# Patient Record
Sex: MALE | Race: WHITE | NOT HISPANIC OR LATINO | Employment: OTHER | URBAN - METROPOLITAN AREA
[De-identification: names, ages, dates, MRNs, and addresses within clinical notes are randomized per-mention and may not be internally consistent; named-entity substitution may affect disease eponyms.]

---

## 2019-09-10 ENCOUNTER — PRE VISIT (OUTPATIENT)
Dept: UROLOGY | Facility: CLINIC | Age: 69
End: 2019-09-10

## 2019-09-10 NOTE — TELEPHONE ENCOUNTER
ONCOLOGY INTAKE: Records Information      APPT INFORMATION:  Referring provider:  self referred  Referring provider s clinic:  na  Reason for visit/diagnosis:  enlarged prostate and prostate cancer  Has patient been notified of appointment date and time?: yes, per pt    RECORDS INFORMATION:  Were the records received with the referral (via [employer])? no    Has patient been seen for any external appt for this diagnosis? yes    If yes, where? [facility]    Has patient had any imaging or procedures outside of [facility] for this condition? yes      If Yes, where? [facility]    ADDITIONAL INFORMATION:  Some notes care everywhere

## 2019-10-16 ENCOUNTER — OFFICE VISIT (OUTPATIENT)
Dept: UROLOGY | Facility: CLINIC | Age: 69
End: 2019-10-16
Payer: COMMERCIAL

## 2019-10-16 VITALS
WEIGHT: 165 LBS | BODY MASS INDEX: 23.62 KG/M2 | HEART RATE: 60 BPM | DIASTOLIC BLOOD PRESSURE: 60 MMHG | HEIGHT: 70 IN | SYSTOLIC BLOOD PRESSURE: 124 MMHG

## 2019-10-16 DIAGNOSIS — N40.0 ENLARGED PROSTATE: Primary | ICD-10-CM

## 2019-10-16 LAB
ALBUMIN UR-MCNC: NEGATIVE MG/DL
APPEARANCE UR: CLEAR
BILIRUB UR QL STRIP: NEGATIVE
COLOR UR AUTO: YELLOW
GLUCOSE UR STRIP-MCNC: NEGATIVE MG/DL
HGB UR QL STRIP: NEGATIVE
KETONES UR STRIP-MCNC: NEGATIVE MG/DL
LEUKOCYTE ESTERASE UR QL STRIP: ABNORMAL
NITRATE UR QL: NEGATIVE
PH UR STRIP: 7 PH (ref 5–7)
PR INTERVAL - MUSE: 9
SOURCE: ABNORMAL
SP GR UR STRIP: 1.01 (ref 1–1.03)
UROBILINOGEN UR STRIP-ACNC: 0.2 EU/DL (ref 0.2–1)

## 2019-10-16 PROCEDURE — 99203 OFFICE O/P NEW LOW 30 MIN: CPT | Mod: 25 | Performed by: UROLOGY

## 2019-10-16 PROCEDURE — 81003 URINALYSIS AUTO W/O SCOPE: CPT | Performed by: UROLOGY

## 2019-10-16 PROCEDURE — 51798 US URINE CAPACITY MEASURE: CPT | Performed by: UROLOGY

## 2019-10-16 RX ORDER — MULTIVIT-MIN/IRON/FOLIC ACID/K 18-600-40
2000 CAPSULE ORAL DAILY
COMMUNITY

## 2019-10-16 RX ORDER — NITROGLYCERIN 0.4 MG/1
0.4 TABLET SUBLINGUAL EVERY 5 MIN PRN
COMMUNITY

## 2019-10-16 RX ORDER — RIBOFLAVIN (VITAMIN B2) 100 MG
100 TABLET ORAL 2 TIMES DAILY
COMMUNITY
Start: 2017-08-24

## 2019-10-16 RX ORDER — ASPIRIN 81 MG/1
81 TABLET ORAL DAILY
COMMUNITY
Start: 2015-08-07

## 2019-10-16 RX ORDER — DIPHENOXYLATE HYDROCHLORIDE AND ATROPINE SULFATE 2.5; .025 MG/1; MG/1
1 TABLET ORAL DAILY
COMMUNITY
Start: 2017-08-24

## 2019-10-16 RX ORDER — DIPHENOXYLATE HYDROCHLORIDE AND ATROPINE SULFATE 2.5; .025 MG/1; MG/1
1 TABLET ORAL
COMMUNITY
Start: 2017-08-24 | End: 2020-06-04

## 2019-10-16 RX ORDER — METOPROLOL TARTRATE 25 MG/1
12.5 TABLET, FILM COATED ORAL 2 TIMES DAILY
COMMUNITY
Start: 2017-08-24

## 2019-10-16 ASSESSMENT — PAIN SCALES - GENERAL: PAINLEVEL: NO PAIN (0)

## 2019-10-16 ASSESSMENT — MIFFLIN-ST. JEOR: SCORE: 1509.69

## 2019-10-16 NOTE — LETTER
10/16/2019       RE: Xu Rush  [address] [address]     Dear Colleague,    Thank you for referring your patient, Xu Rush, to the [facility] [place] at [facility]. Please see a copy of my visit note below.    Xu Rush  is a pleasant 69-year-old male who was diagnosed with grade 3+3 and grade 4+3 adenocarcinoma the prostate a year ago.  He has had an elevated PSA for the past 9 years.  In 2012 he had biopsies at [facility] that were apparently benign.  He had repeat biopsies in 2013 that were benign.  In November last year biopsies with Dr. Cruz showed cancer in the right mid gland and right base.  Apparently his digital rectal exam was benign.  He most recently saw Dr. Bates  in August and had a PSA of 13.1.  A transrectal ultrasound showed a 184 cc gland.  Dr. Villalpando wanted the patient to take finasteride to decrease the size of his of his prostate before he attempted surgical extirpation.  Unfortunately, finasteride worsen the ringing in the patient's ears and he stopped the medication.  He has not had any therapy for his cancer or consider Lupron therapy.  A CT scan of the abdomen and pelvis and a bone scan last November showed no metastases but a 1.1 x 1.2 cm enhancing mass involving the lateral left mid kidney, suspicious for renal cell carcinoma  He is having no trouble voiding.  He has had no hematuria or dysuria  Other past medical history: MI in 2011-his cardiologist says his heart is fit for surgery.  Previous hernia repair, cystoscopy, non-smoker  Medications: Baby aspirin, vitamin D, coenzyme Q 10, metoprolol, multivitamin, vitamin C   Allergies: None  Review of systems: As above  Postvoid residual 9 ml  Exam: Alert and oriented, normal appearance, normal vital signs.  Normal respirations, neuro grossly intact.  Normal sphincter tone, no rectal mass or impaction, large  prostate with very large right lobe of the prostate that is not fixed  Assessment: Grade 4 adenocarcinoma of the prostate, large prostate, left renal mass-probably a renal cell carcinoma  Discussed briefly with Dr. Nolen.  Plan: Will obtain repeat CT scan of the abdomen pelvis  Without and with IV contrast, a bone scan and he will see Dr. Nolen for consultation regarding radical prostatectomy in follow-up of left renal mass        Again, thank you for allowing me to participate in the care of your patient.      Sincerely,    Alfonso Ulloa MD

## 2019-10-16 NOTE — PROGRESS NOTES
Xu Rush  is a pleasant 69-year-old male who was diagnosed with grade 3+3 and grade 4+3 adenocarcinoma the prostate a year ago.  He has had an elevated PSA for the past 9 years.  In 2012 he had biopsies at [facility] that were apparently benign.  He had repeat biopsies in 2013 that were benign.  In November last year biopsies with Dr. Cruz showed cancer in the right mid gland and right base.  Apparently his digital rectal exam was benign.  He most recently saw Dr. Bates  in August and had a PSA of 13.1.  A transrectal ultrasound showed a 184 cc gland.  Dr. Villalpando wanted the patient to take finasteride to decrease the size of his of his prostate before he attempted surgical extirpation.  Unfortunately, finasteride worsen the ringing in the patient's ears and he stopped the medication.  He has not had any therapy for his cancer or consider Lupron therapy.  A CT scan of the abdomen and pelvis and a bone scan last November showed no metastases but a 1.1 x 1.2 cm enhancing mass involving the lateral left mid kidney, suspicious for renal cell carcinoma  He is having no trouble voiding.  He has had no hematuria or dysuria  Other past medical history: MI in 2011-his cardiologist says his heart is fit for surgery.  Previous hernia repair, cystoscopy, non-smoker  Medications: Baby aspirin, vitamin D, coenzyme Q 10, metoprolol, multivitamin, vitamin C   Allergies: None  Review of systems: As above  Postvoid residual 9 ml  Exam: Alert and oriented, normal appearance, normal vital signs.  Normal respirations, neuro grossly intact.  Normal sphincter tone, no rectal mass or impaction, large prostate with very large right lobe of the prostate that is not fixed  Assessment: Grade 4 adenocarcinoma of the prostate, large prostate, left renal mass-probably a renal cell carcinoma  Discussed briefly with Dr. Nolen.  Plan: Will obtain repeat CT scan of the abdomen pelvis  Without and with IV contrast, a bone  scan and he will see Dr. Nolen for consultation regarding radical prostatectomy in follow-up of left renal mass

## 2019-10-21 ENCOUNTER — TELEPHONE (OUTPATIENT)
Dept: UROLOGY | Facility: CLINIC | Age: 69
End: 2019-10-21

## 2019-10-21 DIAGNOSIS — N40.0 ENLARGED PROSTATE: ICD-10-CM

## 2019-10-21 DIAGNOSIS — C61 PROSTATE CANCER (H): Primary | ICD-10-CM

## 2019-10-21 DIAGNOSIS — N28.89 LEFT RENAL MASS: ICD-10-CM

## 2019-10-21 NOTE — TELEPHONE ENCOUNTER
Orders placed. Called Xu. He will call to schedule imaging.  MICHAEL Lange RN  ____________________________________________________    Yes, I'd like him to have these scans before our appointment.     Thanks,   Je Nolen M.D.   _____________________________________________________    Per Dr. Ulloa's note of 10/16/19, imaging needed is CT Abdomen and Pelvis with and without contrast, and a bone scan.   Message and question below forwarded to Dr. Nolen.  MICHAEL Lange RN      [facility]    Phone Message    May a detailed message be left on voicemail: yes    Reason for Call: Other: Pt called and said that Dr. Ulloa would like for the pt to do a CT scan before he sees Dr. Nolen on 11/04.  Pt said that at the [facility] their soonest is 11/01.  Pt wanted to know if that would be enough time or should he come to the clinic for the CT scan. Pt said that Fridays in the afternoon would work best.  Please call the pt back to discuss this. Thanks!     Action Taken: Message routed to:  [facility] ([facility] urology clinic

## 2019-10-30 ENCOUNTER — HOSPITAL ENCOUNTER (OUTPATIENT)
Dept: CT IMAGING | Facility: CLINIC | Age: 69
Discharge: HOME | End: 2019-10-30
Attending: UROLOGY | Admitting: UROLOGY
Payer: COMMERCIAL

## 2019-10-30 ENCOUNTER — HOSPITAL ENCOUNTER (OUTPATIENT)
Dept: NUCLEAR MEDICINE | Facility: CLINIC | Age: 69
Setting detail: NUCLEAR MEDICINE
End: 2019-10-30
Attending: UROLOGY
Payer: COMMERCIAL

## 2019-10-30 ENCOUNTER — HOSPITAL ENCOUNTER (OUTPATIENT)
Dept: NUCLEAR MEDICINE | Facility: CLINIC | Age: 69
Setting detail: NUCLEAR MEDICINE
Discharge: HOME | End: 2019-10-30
Attending: UROLOGY | Admitting: UROLOGY
Payer: COMMERCIAL

## 2019-10-30 DIAGNOSIS — N28.89 LEFT RENAL MASS: ICD-10-CM

## 2019-10-30 DIAGNOSIS — C61 PROSTATE CANCER (H): ICD-10-CM

## 2019-10-30 DIAGNOSIS — N40.0 ENLARGED PROSTATE: ICD-10-CM

## 2019-10-30 LAB
CREAT BLD-MCNC: 1 MG/DL (ref 0.66–1.25)
GFR SERPL CREATININE-BSD FRML MDRD: 74 ML/MIN/{1.73_M2}

## 2019-10-30 PROCEDURE — 82565 ASSAY OF CREATININE: CPT

## 2019-10-30 PROCEDURE — 78306 BONE IMAGING WHOLE BODY: CPT

## 2019-10-30 PROCEDURE — 34300033 ZZH RX 343: Performed by: UROLOGY

## 2019-10-30 PROCEDURE — 25000125 ZZHC RX 250: Performed by: UROLOGY

## 2019-10-30 PROCEDURE — 74177 CT ABD & PELVIS W/CONTRAST: CPT

## 2019-10-30 PROCEDURE — A9503 TC99M MEDRONATE: HCPCS | Performed by: UROLOGY

## 2019-10-30 PROCEDURE — 25000128 H RX IP 250 OP 636: Performed by: UROLOGY

## 2019-10-30 RX ORDER — IOPAMIDOL 755 MG/ML
81 INJECTION, SOLUTION INTRAVASCULAR ONCE
Status: COMPLETED | OUTPATIENT
Start: 2019-10-30 | End: 2019-10-30

## 2019-10-30 RX ORDER — TC 99M MEDRONATE 20 MG/10ML
25 INJECTION, POWDER, LYOPHILIZED, FOR SOLUTION INTRAVENOUS ONCE
Status: COMPLETED | OUTPATIENT
Start: 2019-10-30 | End: 2019-10-30

## 2019-10-30 RX ADMIN — IOPAMIDOL 81 ML: 755 INJECTION, SOLUTION INTRAVENOUS at 11:57

## 2019-10-30 RX ADMIN — SODIUM CHLORIDE 63 ML: 9 INJECTION, SOLUTION INTRAVENOUS at 11:57

## 2019-10-30 RX ADMIN — TC 99M MEDRONATE 28 MCI.: 20 INJECTION, POWDER, LYOPHILIZED, FOR SOLUTION INTRAVENOUS at 11:48

## 2019-11-04 ENCOUNTER — OFFICE VISIT (OUTPATIENT)
Dept: UROLOGY | Facility: CLINIC | Age: 69
End: 2019-11-04
Payer: COMMERCIAL

## 2019-11-04 VITALS
SYSTOLIC BLOOD PRESSURE: 142 MMHG | DIASTOLIC BLOOD PRESSURE: 68 MMHG | HEIGHT: 70 IN | WEIGHT: 165 LBS | BODY MASS INDEX: 23.62 KG/M2 | HEART RATE: 60 BPM

## 2019-11-04 DIAGNOSIS — C61 PROSTATE CANCER (H): Primary | ICD-10-CM

## 2019-11-04 DIAGNOSIS — N28.89 LEFT RENAL MASS: ICD-10-CM

## 2019-11-04 PROCEDURE — 99214 OFFICE O/P EST MOD 30 MIN: CPT | Performed by: UROLOGY

## 2019-11-04 ASSESSMENT — PAIN SCALES - GENERAL: PAINLEVEL: NO PAIN (0)

## 2019-11-04 ASSESSMENT — MIFFLIN-ST. JEOR: SCORE: 1509.69

## 2019-11-04 NOTE — LETTER
"11/4/2019     RE: Xu Rush  [address] [facility] [address]     Dear Colleague,    Thank you for referring your patient, Xu Rush, to the [facility]OLOG[facility] [place] at [facility]. Please see a copy of my visit note below.    CHANDAN  CHIEF COMPLAINT   It was my pleasure to see Xu Rush who is a 69 year old male for follow-up of prostate cancer .      HPI   Xu Rush is a very pleasant 69 year old male who presents with a history of prostate cancer. He was seen by Dr. Ulloa on 10/16/19 and referred for surgical consultation. He has history of [place] 4+3=7 prostate cancer - pathology report not available. Has TRUS/Bx with Dr. Cruz in 11/2018 which demonstrated prostate cancer in the right mid gland and right base with benign DEREK and evidence of 184cc gland. PSA 13.1 at that time. He saw Dr. Villalpando who recommended trial of finasteride 5mg (Proscar) to shrink the prostate. Unfortunately, finasteride caused tinnitus and the patient stopped the medication. CT and bone scan from 11/2018 without evidence of mets, but a 1.2cm left renal mass.     No LUTS and low PVR     PHYSICAL EXAM  Patient is a 69 year old  male   Vitals: Blood pressure (!) 142/68, pulse 60, height 1.778 m (5' 10\"), weight 74.8 kg (165 lb).  General Appearance Adult: Body mass index is 23.68 kg/m .  Alert, no acute distress, oriented  HENT: throat/mouth:normal, good dentition  Lungs: no respiratory distress, or pursed lip breathing  Heart: No obvious jugular venous distension present  Abdomen: soft, nontender, no organomegaly or masses  Musculoskeltal: extremities normal, no peripheral edema  Skin: no suspicious lesions or rashes  Neuro: Alert, oriented, speech and mentation normal  Psych: affect and mood normal  Gait: Normal    UA RESULTS:  Recent Labs   Lab Test 10/16/19  1308   COLOR Yellow   APPEARANCE Clear   URINEGLC Negative   URINEBILI Negative "   URINEKETONE Negative   SG 1.010   UBLD Negative   URINEPH 7.0   PROTEIN Negative   UROBILINOGEN 0.2   NITRITE Negative   LEUKEST Trace*       IMAGING:  All pertinent imaging reviewed:    All imaging studies reviewed by me.  I personally reviewed these imaging films.  A formal report from radiology will follow.    NM BONE SCAN:  FINDINGS: There is a moderate-sized focus of moderately intense  increased radiotracer uptake within the right medial posterior pelvis  adjacent to the sacroiliac joint. This would correspond to the area of  sclerosis seen in the right iliac bone on the CT earlier today. There  are a few scattered small areas of increased radiotracer uptake within  bilateral ribs. This includes two adjacent left posterior mid to lower  ribs, possibly the eighth and ninth ribs. Their adjacent location  would suggest this is posttraumatic. There is an additional area of  increased uptake in the posterior aspect of a right upper rib, likely  the fourth rib. This is indeterminate. There is degenerative uptake  within both wrists and to a mild degree the right knee. No other  abnormal osseous uptake is seen. Normal soft tissue distribution is  noted.                                                                  IMPRESSION:   1. Increased radiotracer uptake within the right iliac bone adjacent  to the sacroiliac joint corresponding to the area of sclerosis on the  CT. In a patient with prostate cancer, metastatic disease needs to be  considered. If definitive diagnosis is needed, a biopsy could be  considered.  2. Small focus of increased uptake in the right posterior fourth rib.  This is an indeterminate lesion, but metastatic disease cannot be  excluded.  3. Probable posttraumatic changes of two adjacent left lower ribs and  degenerative changes elsewhere as described above.   4. No abnormal uptake is seen within the left iliac bone corresponding  to the area of lucency on the CT. The CT finding is,  therefore, of  doubtful clinical significance.    CT ABD/PEL:  FINDINGS:   Lung bases: Lung bases are clear. No pleural effusion or pericardial  effusion.     Abdomen: Scattered hypodensities are noted throughout the liver, one  at the hepatic dome represents a simple cyst, the rest are too small  to characterize. Scattered subcentimeter hypodensities are noted  throughout both kidneys, too small to characterize. The spleen,  adrenal glands, and pancreas show no focal abnormality. Postoperative  changes of cholecystectomy. No intrahepatic or extrahepatic biliary  dilatation. Hepatic and portal veins are patent. No intraperitoneal  free air or free fluid.     Small and large bowel are normal in caliber without evidence of  obstruction. The prostate is heterogeneous and enlarged measuring 8.3  x 6.5 cm. The prostate is causing mass effect on the posterior aspect  of the bladder. Prominent lymph node is seen posterior and to the  right of the prostate measuring 1.6 x 1.2 cm (series 5, image 67).  Abdominal aortic aneurysm. Nonenlarged bilateral periaortic lymph  nodes are noted.     Bones: Ill-defined sclerotic lesion in the right iliac bone measuring  2.9 cm (series 5, image 31). There are two lytic lesions in the left  iliac bone measuring 1.3 cm and 1.1 cm (series 5, image 31). These  will be better evaluated on bone scan from same day.                                                                   IMPRESSION:  1. Heterogeneous enlarged prostate.  2. Prominent lymph node along the posterior right aspect of the  prostate measuring 1.6 x 1.2 cm, metastatic disease cannot be  excluded.  3. Indeterminant sclerotic lesion in the right iliac bone and lytic  lesions in the left iliac bone, these will be better evaluated on the  nuclear medicine bone scan from today.    ASSESSMENT and PLAN  69 year old man with PSA 13.1, [place] 4+3=7 prostate cancer with enlarge gland at 184cc and CT with 1.6cm lymph node vs locally  advanced disease on the right base.    - We discussed that his case is unique and challenging given the size of his gland  - We discussed that I will discuss his case at our tumor board and plan for follow up in 2-3 weeks for further discussion    LEFT renal mass  - on updated imaging from 10/30/19 the small renal hypodensities were too small characterize  - We discussed that this is reassuring and that we will obtain follow up surveillance imaging in the future,     I spent over 25 minutes with the patient.  Over half this time was spent on counseling regarding the above.    Je Nolen MD   Urology  [facility]  Clinic Phone [phone]

## 2019-11-04 NOTE — PROGRESS NOTES
"[facility]  CHIEF COMPLAINT   It was my pleasure to see Xu Rush who is a 69 year old male for follow-up of prostate cancer .      HPI   Xu Rush is a very pleasant 69 year old male who presents with a history of prostate cancer. He was seen by Dr. Ulloa on 10/16/19 and referred for surgical consultation. He has history of [place] 4+3=7 prostate cancer - pathology report not available. Has TRUS/Bx with Dr. Cruz in 11/2018 which demonstrated prostate cancer in the right mid gland and right base with benign DEREK and evidence of 184cc gland. PSA 13.1 at that time. He saw Dr. Villalpando who recommended trial of finasteride 5mg (Proscar) to shrink the prostate. Unfortunately, finasteride caused tinnitus and the patient stopped the medication. CT and bone scan from 11/2018 without evidence of mets, but a 1.2cm left renal mass.     No LUTS and low PVR     PHYSICAL EXAM  Patient is a 69 year old  male   Vitals: Blood pressure (!) 142/68, pulse 60, height 1.778 m (5' 10\"), weight 74.8 kg (165 lb).  General Appearance Adult: Body mass index is 23.68 kg/m .  Alert, no acute distress, oriented  HENT: throat/mouth:normal, good dentition  Lungs: no respiratory distress, or pursed lip breathing  Heart: No obvious jugular venous distension present  Abdomen: soft, nontender, no organomegaly or masses  Musculoskeltal: extremities normal, no peripheral edema  Skin: no suspicious lesions or rashes  Neuro: Alert, oriented, speech and mentation normal  Psych: affect and mood normal  Gait: Normal    UA RESULTS:  Recent Labs   Lab Test 10/16/19  1308   COLOR Yellow   APPEARANCE Clear   URINEGLC Negative   URINEBILI Negative   URINEKETONE Negative   SG 1.010   UBLD Negative   URINEPH 7.0   PROTEIN Negative   UROBILINOGEN 0.2   NITRITE Negative   LEUKEST Trace*       IMAGING:  All pertinent imaging reviewed:    All imaging studies reviewed by me.  I personally reviewed these imaging films.  A formal report from radiology will " follow.    NM BONE SCAN:  FINDINGS: There is a moderate-sized focus of moderately intense  increased radiotracer uptake within the right medial posterior pelvis  adjacent to the sacroiliac joint. This would correspond to the area of  sclerosis seen in the right iliac bone on the CT earlier today. There  are a few scattered small areas of increased radiotracer uptake within  bilateral ribs. This includes two adjacent left posterior mid to lower  ribs, possibly the eighth and ninth ribs. Their adjacent location  would suggest this is posttraumatic. There is an additional area of  increased uptake in the posterior aspect of a right upper rib, likely  the fourth rib. This is indeterminate. There is degenerative uptake  within both wrists and to a mild degree the right knee. No other  abnormal osseous uptake is seen. Normal soft tissue distribution is  noted.                                                                  IMPRESSION:   1. Increased radiotracer uptake within the right iliac bone adjacent  to the sacroiliac joint corresponding to the area of sclerosis on the  CT. In a patient with prostate cancer, metastatic disease needs to be  considered. If definitive diagnosis is needed, a biopsy could be  considered.  2. Small focus of increased uptake in the right posterior fourth rib.  This is an indeterminate lesion, but metastatic disease cannot be  excluded.  3. Probable posttraumatic changes of two adjacent left lower ribs and  degenerative changes elsewhere as described above.   4. No abnormal uptake is seen within the left iliac bone corresponding  to the area of lucency on the CT. The CT finding is, therefore, of  doubtful clinical significance.    CT ABD/PEL:  FINDINGS:   Lung bases: Lung bases are clear. No pleural effusion or pericardial  effusion.     Abdomen: Scattered hypodensities are noted throughout the liver, one  at the hepatic dome represents a simple cyst, the rest are too small  to  characterize. Scattered subcentimeter hypodensities are noted  throughout both kidneys, too small to characterize. The spleen,  adrenal glands, and pancreas show no focal abnormality. Postoperative  changes of cholecystectomy. No intrahepatic or extrahepatic biliary  dilatation. Hepatic and portal veins are patent. No intraperitoneal  free air or free fluid.     Small and large bowel are normal in caliber without evidence of  obstruction. The prostate is heterogeneous and enlarged measuring 8.3  x 6.5 cm. The prostate is causing mass effect on the posterior aspect  of the bladder. Prominent lymph node is seen posterior and to the  right of the prostate measuring 1.6 x 1.2 cm (series 5, image 67).  Abdominal aortic aneurysm. Nonenlarged bilateral periaortic lymph  nodes are noted.     Bones: Ill-defined sclerotic lesion in the right iliac bone measuring  2.9 cm (series 5, image 31). There are two lytic lesions in the left  iliac bone measuring 1.3 cm and 1.1 cm (series 5, image 31). These  will be better evaluated on bone scan from same day.                                                                   IMPRESSION:  1. Heterogeneous enlarged prostate.  2. Prominent lymph node along the posterior right aspect of the  prostate measuring 1.6 x 1.2 cm, metastatic disease cannot be  excluded.  3. Indeterminant sclerotic lesion in the right iliac bone and lytic  lesions in the left iliac bone, these will be better evaluated on the  nuclear medicine bone scan from today.    ASSESSMENT and PLAN  69 year old man with PSA 13.1, [place] 4+3=7 prostate cancer with enlarge gland at 184cc and CT with 1.6cm lymph node vs locally advanced disease on the right base.    - We discussed that his case is unique and challenging given the size of his gland  - We discussed that I will discuss his case at our tumor board and plan for follow up in 2-3 weeks for further discussion    LEFT renal mass  - on updated imaging from 10/30/19 the  small renal hypodensities were too small characterize  - We discussed that this is reassuring and that we will obtain follow up surveillance imaging in the future,     I spent over 25 minutes with the patient.  Over half this time was spent on counseling regarding the above.    Je Nolen MD   Urology  [facility]  Clinic Phone [phone]

## 2019-11-04 NOTE — NURSING NOTE
Chief Complaint   Patient presents with     Clinic Care Coordination - Follow-up     Prostate Cancer      Milly Flores LPN

## 2019-11-20 ENCOUNTER — OFFICE VISIT (OUTPATIENT)
Dept: UROLOGY | Facility: CLINIC | Age: 69
End: 2019-11-20
Payer: COMMERCIAL

## 2019-11-20 VITALS
HEART RATE: 69 BPM | HEIGHT: 70 IN | BODY MASS INDEX: 23.62 KG/M2 | OXYGEN SATURATION: 97 % | WEIGHT: 165 LBS | DIASTOLIC BLOOD PRESSURE: 80 MMHG | SYSTOLIC BLOOD PRESSURE: 140 MMHG

## 2019-11-20 DIAGNOSIS — C61 MALIGNANT NEOPLASM OF PROSTATE (H): Primary | ICD-10-CM

## 2019-11-20 PROCEDURE — 96402 CHEMO HORMON ANTINEOPL SQ/IM: CPT | Performed by: UROLOGY

## 2019-11-20 PROCEDURE — 99214 OFFICE O/P EST MOD 30 MIN: CPT | Mod: 25 | Performed by: UROLOGY

## 2019-11-20 ASSESSMENT — MIFFLIN-ST. JEOR: SCORE: 1509.69

## 2019-11-20 ASSESSMENT — PAIN SCALES - GENERAL: PAINLEVEL: NO PAIN (0)

## 2019-11-20 NOTE — NURSING NOTE
Chief Complaint   Patient presents with     Follow Up     patient here to discuss prostate results     The following medication was given:     MEDICATION:  Lupron [place] 22.5 mg  ROUTE: IM  SITE: [facility]  DOSE: 22.5  LOT #: [id number]  : SERGIO  EXPIRATION DATE: 02/20/2022   NDC#: 2513-7392-56    Was there drug waste? No  Multi-dose vial: No    Gina Tamayo CMA  November 20, 2019

## 2019-11-20 NOTE — PROGRESS NOTES
"[facility]  CHIEF COMPLAINT   It was my pleasure to see Xu Rush who is a 69 year old male for follow-up of prostate cancer .      HPI   Xu Rush is a very pleasant 69 year old male who presents with a history of prostate cancer. He was seen by Dr. Ulloa on 10/16/19 and referred for surgical consultation. He has history of [place] 4+3=7 prostate cancer - pathology report not available. Has TRUS/Bx with Dr. Cruz in 11/2018 which demonstrated prostate cancer in the right mid gland and right base with benign DEREK and evidence of 184cc gland. PSA 13.1 at that time. He saw Dr. Villalpando who recommended trial of finasteride 5mg (Proscar) to shrink the prostate. Unfortunately, finasteride caused tinnitus and the patient stopped the medication. CT and bone scan from 11/2018 without evidence of mets, but a 1.2cm left renal mass.     No LUTS and low PVR     PHYSICAL EXAM  Patient is a 69 year old  male   Vitals: Blood pressure (!) 140/80, pulse 69, height 1.778 m (5' 10\"), weight 74.8 kg (165 lb), SpO2 97 %.  General Appearance Adult: Body mass index is 23.68 kg/m .  Alert, no acute distress, oriented  HENT: throat/mouth:normal, good dentition  Lungs: no respiratory distress, or pursed lip breathing  Heart: No obvious jugular venous distension present  Abdomen: soft, nontender, no organomegaly or masses  Musculoskeltal: extremities normal, no peripheral edema  Skin: no suspicious lesions or rashes  Neuro: Alert, oriented, speech and mentation normal  Psych: affect and mood normal  Gait: Normal    UA RESULTS:  Recent Labs   Lab Test 10/16/19  1308   COLOR Yellow   APPEARANCE Clear   URINEGLC Negative   URINEBILI Negative   URINEKETONE Negative   SG 1.010   UBLD Negative   URINEPH 7.0   PROTEIN Negative   UROBILINOGEN 0.2   NITRITE Negative   LEUKEST Trace*     PATHOLOGY:   TRUS/BX 11/12/18 BY DR. CRUZ  A. LEFT BASE - NO EVIDENCE OF DISEASE   B. LEFT MID - NO EVIDENCE OF DISEASE   C. LEFT APEX - NO EVIDENCE OF " DISEASE   D. RIGHT BASE - [place] 4+3=7 in 2/2 cores, involving 5-10%  E. RIGHT MID - [place] 3+3=6 in 1/2 cores, involving 5%  F. RIGHT APEX - NO EVIDENCE OF DISEASE     IMAGING:  All pertinent imaging reviewed:    All imaging studies reviewed by me.  I personally reviewed these imaging films.  A formal report from radiology will follow.    NM BONE SCAN:  FINDINGS: There is a moderate-sized focus of moderately intense  increased radiotracer uptake within the right medial posterior pelvis  adjacent to the sacroiliac joint. This would correspond to the area of  sclerosis seen in the right iliac bone on the CT earlier today. There  are a few scattered small areas of increased radiotracer uptake within  bilateral ribs. This includes two adjacent left posterior mid to lower  ribs, possibly the eighth and ninth ribs. Their adjacent location  would suggest this is posttraumatic. There is an additional area of  increased uptake in the posterior aspect of a right upper rib, likely  the fourth rib. This is indeterminate. There is degenerative uptake  within both wrists and to a mild degree the right knee. No other  abnormal osseous uptake is seen. Normal soft tissue distribution is  noted.                                                                  IMPRESSION:   1. Increased radiotracer uptake within the right iliac bone adjacent  to the sacroiliac joint corresponding to the area of sclerosis on the  CT. In a patient with prostate cancer, metastatic disease needs to be  considered. If definitive diagnosis is needed, a biopsy could be  considered.  2. Small focus of increased uptake in the right posterior fourth rib.  This is an indeterminate lesion, but metastatic disease cannot be  excluded.  3. Probable posttraumatic changes of two adjacent left lower ribs and  degenerative changes elsewhere as described above.   4. No abnormal uptake is seen within the left iliac bone corresponding  to the area of lucency on the CT.  The CT finding is, therefore, of  doubtful clinical significance.    CT ABD/PEL:  FINDINGS:   Lung bases: Lung bases are clear. No pleural effusion or pericardial  effusion.     Abdomen: Scattered hypodensities are noted throughout the liver, one  at the hepatic dome represents a simple cyst, the rest are too small  to characterize. Scattered subcentimeter hypodensities are noted  throughout both kidneys, too small to characterize. The spleen,  adrenal glands, and pancreas show no focal abnormality. Postoperative  changes of cholecystectomy. No intrahepatic or extrahepatic biliary  dilatation. Hepatic and portal veins are patent. No intraperitoneal  free air or free fluid.     Small and large bowel are normal in caliber without evidence of  obstruction. The prostate is heterogeneous and enlarged measuring 8.3  x 6.5 cm. The prostate is causing mass effect on the posterior aspect  of the bladder. Prominent lymph node is seen posterior and to the  right of the prostate measuring 1.6 x 1.2 cm (series 5, image 67).  Abdominal aortic aneurysm. Nonenlarged bilateral periaortic lymph  nodes are noted.     Bones: Ill-defined sclerotic lesion in the right iliac bone measuring  2.9 cm (series 5, image 31). There are two lytic lesions in the left  iliac bone measuring 1.3 cm and 1.1 cm (series 5, image 31). These  will be better evaluated on bone scan from same day.                                                                   IMPRESSION:  1. Heterogeneous enlarged prostate.  2. Prominent lymph node along the posterior right aspect of the  prostate measuring 1.6 x 1.2 cm, metastatic disease cannot be  excluded.  3. Indeterminant sclerotic lesion in the right iliac bone and lytic  lesions in the left iliac bone, these will be better evaluated on the  nuclear medicine bone scan from today.    ASSESSMENT and PLAN  69 year old man with PSA 13.1, [place] 4+3=7 prostate cancer with enlarge gland at 184cc and CT with 1.6cm lymph  node vs locally advanced disease on the right base.    - We discussed that his case is unique and challenging given the size of his gland  - I discussed his case with several colleagues and group recommendation is for neoadjuvant androgen blockage with Lupron with re-assessment in 3-6 months.   - We had a long discussion about the possible risks and benefits of the ADT, he is quite nervous about this treatment, however we discussed that surgery at this time would carry a high degree of risk - especially from the RIGHT base node/locally advanced disease with respect to rectal injury  - Following thorough discussion he is amenable to 3 months of ADT and will then plan for MRI to assess for treatment response   - He is very hopeful to have surgery during this winter  - We reviewed the side effects of androgen deprivation therapy. It does cause symptoms similar to those seen in post-menopausal females with decline in estrogen. Men have hot flashes, loss of libido, erectile dysfunction, fatigue, quicker loss of muscle mass, weight gain, osteopenia, increased risk of death from cardiovascular disease, emotional and cognitive changes.    - Order for MRI placed    LEFT renal mass  - on updated imaging from 10/30/19 the small renal hypodensities were too small characterize  - We discussed that this is reassuring and that we will obtain follow up surveillance imaging in the future,     I spent over 25 minutes with the patient.  Over half this time was spent on counseling regarding the above.    Je Nolen MD   Urology  [facility]  Clinic Phone [phone]

## 2019-11-20 NOTE — LETTER
"11/20/2019       RE: Xu Rush  [address]  [place] [id number]     Dear Colleague,    Thank you for referring your patient, Xu Rush, to the [facility] [place] [facility]. Please see a copy of my visit note below.    CHANDAN  CHIEF COMPLAINT   It was my pleasure to see Xu Rush who is a 69 year old male for follow-up of prostate cancer .      HPI   Xu Rush is a very pleasant 69 year old male who presents with a history of prostate cancer. He was seen by Dr. Ulloa on 10/16/19 and referred for surgical consultation. He has history of Celio 4+3=7 prostate cancer - pathology report not available. Has TRUS/Bx with Dr. Cruz in 11/2018 which demonstrated prostate cancer in the right mid gland and right base with benign DEERK and evidence of 184cc gland. PSA 13.1 at that time. He saw Dr. Villalpando who recommended trial of finasteride 5mg (Proscar) to shrink the prostate. Unfortunately, finasteride caused tinnitus and the patient stopped the medication. CT and bone scan from 11/2018 without evidence of mets, but a 1.2cm left renal mass.     No LUTS and low PVR     PHYSICAL EXAM  Patient is a 69 year old  male   Vitals: Blood pressure (!) 140/80, pulse 69, height 1.778 m (5' 10\"), weight 74.8 kg (165 lb), SpO2 97 %.  General Appearance Adult: Body mass index is 23.68 kg/m .  Alert, no acute distress, oriented  HENT: throat/mouth:normal, good dentition  Lungs: no respiratory distress, or pursed lip breathing  Heart: No obvious jugular venous distension present  Abdomen: soft, nontender, no organomegaly or masses  Musculoskeltal: extremities normal, no peripheral edema  Skin: no suspicious lesions or rashes  Neuro: Alert, oriented, speech and mentation normal  Psych: affect and mood normal  Gait: Normal    UA RESULTS:  Recent Labs   Lab Test 10/16/19  1308   COLOR Yellow   APPEARANCE Clear   URINEGLC Negative   URINEBILI " Negative   URINEKETONE Negative   SG 1.010   UBLD Negative   URINEPH 7.0   PROTEIN Negative   UROBILINOGEN 0.2   NITRITE Negative   LEUKEST Trace*     PATHOLOGY:   TRUS/BX 11/12/18 BY DR. BUSCH  A. LEFT BASE - NO EVIDENCE OF DISEASE   B. LEFT MID - NO EVIDENCE OF DISEASE   C. LEFT APEX - NO EVIDENCE OF DISEASE   D. RIGHT BASE - [place] 4+3=7 in 2/2 cores, involving 5-10%  E. RIGHT MID - Celio 3+3=6 in 1/2 cores, involving 5%  F. RIGHT APEX - NO EVIDENCE OF DISEASE     IMAGING:  All pertinent imaging reviewed:    All imaging studies reviewed by me.  I personally reviewed these imaging films.  A formal report from radiology will follow.    NM BONE SCAN:  FINDINGS: There is a moderate-sized focus of moderately intense  increased radiotracer uptake within the right medial posterior pelvis  adjacent to the sacroiliac joint. This would correspond to the area of  sclerosis seen in the right iliac bone on the CT earlier today. There  are a few scattered small areas of increased radiotracer uptake within  bilateral ribs. This includes two adjacent left posterior mid to lower  ribs, possibly the eighth and ninth ribs. Their adjacent location  would suggest this is posttraumatic. There is an additional area of  increased uptake in the posterior aspect of a right upper rib, likely  the fourth rib. This is indeterminate. There is degenerative uptake  within both wrists and to a mild degree the right knee. No other  abnormal osseous uptake is seen. Normal soft tissue distribution is  noted.                                                                  IMPRESSION:   1. Increased radiotracer uptake within the right iliac bone adjacent  to the sacroiliac joint corresponding to the area of sclerosis on the  CT. In a patient with prostate cancer, metastatic disease needs to be  considered. If definitive diagnosis is needed, a biopsy could be  considered.  2. Small focus of increased uptake in the right posterior fourth  rib.  This is an indeterminate lesion, but metastatic disease cannot be  excluded.  3. Probable posttraumatic changes of two adjacent left lower ribs and  degenerative changes elsewhere as described above.   4. No abnormal uptake is seen within the left iliac bone corresponding  to the area of lucency on the CT. The CT finding is, therefore, of  doubtful clinical significance.    CT ABD/PEL:  FINDINGS:   Lung bases: Lung bases are clear. No pleural effusion or pericardial  effusion.     Abdomen: Scattered hypodensities are noted throughout the liver, one  at the hepatic dome represents a simple cyst, the rest are too small  to characterize. Scattered subcentimeter hypodensities are noted  throughout both kidneys, too small to characterize. The spleen,  adrenal glands, and pancreas show no focal abnormality. Postoperative  changes of cholecystectomy. No intrahepatic or extrahepatic biliary  dilatation. Hepatic and portal veins are patent. No intraperitoneal  free air or free fluid.     Small and large bowel are normal in caliber without evidence of  obstruction. The prostate is heterogeneous and enlarged measuring 8.3  x 6.5 cm. The prostate is causing mass effect on the posterior aspect  of the bladder. Prominent lymph node is seen posterior and to the  right of the prostate measuring 1.6 x 1.2 cm (series 5, image 67).  Abdominal aortic aneurysm. Nonenlarged bilateral periaortic lymph  nodes are noted.     Bones: Ill-defined sclerotic lesion in the right iliac bone measuring  2.9 cm (series 5, image 31). There are two lytic lesions in the left  iliac bone measuring 1.3 cm and 1.1 cm (series 5, image 31). These  will be better evaluated on bone scan from same day.                                                                   IMPRESSION:  1. Heterogeneous enlarged prostate.  2. Prominent lymph node along the posterior right aspect of the  prostate measuring 1.6 x 1.2 cm, metastatic disease cannot be  excluded.  3.  Indeterminant sclerotic lesion in the right iliac bone and lytic  lesions in the left iliac bone, these will be better evaluated on the  nuclear medicine bone scan from today.    ASSESSMENT and PLAN  69 year old man with PSA 13.1, [place] 4+3=7 prostate cancer with enlarge gland at 184cc and CT with 1.6cm lymph node vs locally advanced disease on the right base.    - We discussed that his case is unique and challenging given the size of his gland  - I discussed his case with several colleagues and group recommendation is for neoadjuvant androgen blockage with Lupron with re-assessment in 3-6 months.   - We had a long discussion about the possible risks and benefits of the ADT, he is quite nervous about this treatment, however we discussed that surgery at this time would carry a high degree of risk - especially from the RIGHT base node/locally advanced disease with respect to rectal injury  - Following thorough discussion he is amenable to 3 months of ADT and will then plan for MRI to assess for treatment response   - He is very hopeful to have surgery during this winter  - We reviewed the side effects of androgen deprivation therapy. It does cause symptoms similar to those seen in post-menopausal females with decline in estrogen. Men have hot flashes, loss of libido, erectile dysfunction, fatigue, quicker loss of muscle mass, weight gain, osteopenia, increased risk of death from cardiovascular disease, emotional and cognitive changes.    - Order for MRI placed    LEFT renal mass  - on updated imaging from 10/30/19 the small renal hypodensities were too small characterize  - We discussed that this is reassuring and that we will obtain follow up surveillance imaging in the future,     I spent over 25 minutes with the patient.  Over half this time was spent on counseling regarding the above.    Je Nolen MD   Urology  [facility]  Clinic Phone [phone]

## 2019-12-23 DIAGNOSIS — N40.0 BPH (BENIGN PROSTATIC HYPERPLASIA): Primary | ICD-10-CM

## 2019-12-23 RX ORDER — TAMSULOSIN HYDROCHLORIDE 0.4 MG/1
0.4 CAPSULE ORAL DAILY
Qty: 30 CAPSULE | Refills: 11 | Status: SHIPPED | OUTPATIENT
Start: 2019-12-23 | End: 2020-03-25

## 2020-03-02 ENCOUNTER — TELEPHONE (OUTPATIENT)
Dept: UROLOGY | Facility: CLINIC | Age: 70
End: 2020-03-02

## 2020-03-02 NOTE — TELEPHONE ENCOUNTER
Informed patient of imaging ordered. Patient thinks he has had this procedure done a few years ago. Patient plans to have done at outside source. Informed him we would need to fax an order. If patient plans to get imaging done at outside source, he will provide fax number to send order.     Milly Flores LPN

## 2020-03-02 NOTE — TELEPHONE ENCOUNTER
[facility] Call Center    Phone Message    May a detailed message be left on voicemail: yes     Reason for Call: Other: Pt is requesting a call back with clarification on what type of imaging he is needing.  Please follow up.       Action Taken: Message routed to:  [facility]    Travel Screening: Not Applicable

## 2020-03-09 ENCOUNTER — HOSPITAL ENCOUNTER (OUTPATIENT)
Dept: MRI IMAGING | Facility: CLINIC | Age: 70
Discharge: HOME | End: 2020-03-09
Attending: UROLOGY | Admitting: UROLOGY
Payer: COMMERCIAL

## 2020-03-09 DIAGNOSIS — C61 MALIGNANT NEOPLASM OF PROSTATE (H): ICD-10-CM

## 2020-03-09 LAB
CREAT BLD-MCNC: 1.1 MG/DL (ref 0.66–1.25)
GFR SERPL CREATININE-BSD FRML MDRD: 66 ML/MIN/{1.73_M2}

## 2020-03-09 PROCEDURE — 82565 ASSAY OF CREATININE: CPT

## 2020-03-09 PROCEDURE — A9585 GADOBUTROL INJECTION: HCPCS | Performed by: UROLOGY

## 2020-03-09 PROCEDURE — 72197 MRI PELVIS W/O & W/DYE: CPT

## 2020-03-09 PROCEDURE — 25500064 ZZH RX 255 OP 636: Performed by: UROLOGY

## 2020-03-09 RX ORDER — GADOBUTROL 604.72 MG/ML
7.5 INJECTION INTRAVENOUS ONCE
Status: COMPLETED | OUTPATIENT
Start: 2020-03-09 | End: 2020-03-09

## 2020-03-09 RX ADMIN — GADOBUTROL 7.5 ML: 604.72 INJECTION INTRAVENOUS at 15:46

## 2020-03-11 ENCOUNTER — HEALTH MAINTENANCE LETTER (OUTPATIENT)
Age: 70
End: 2020-03-11

## 2020-03-11 ENCOUNTER — OFFICE VISIT (OUTPATIENT)
Dept: UROLOGY | Facility: CLINIC | Age: 70
End: 2020-03-11
Payer: COMMERCIAL

## 2020-03-11 VITALS
OXYGEN SATURATION: 98 % | WEIGHT: 168 LBS | HEART RATE: 86 BPM | DIASTOLIC BLOOD PRESSURE: 60 MMHG | BODY MASS INDEX: 24.05 KG/M2 | HEIGHT: 70 IN | SYSTOLIC BLOOD PRESSURE: 116 MMHG

## 2020-03-11 DIAGNOSIS — C61 PROSTATE CANCER (H): Primary | ICD-10-CM

## 2020-03-11 PROCEDURE — 99214 OFFICE O/P EST MOD 30 MIN: CPT | Performed by: UROLOGY

## 2020-03-11 ASSESSMENT — MIFFLIN-ST. JEOR: SCORE: 1523.29

## 2020-03-11 NOTE — LETTER
"3/11/2020       RE: Xu Rush  [address]  [facility] [address]     Dear Colleague,    Thank you for referring your patient, Xu Rush, to the [facility] [place] at [facility]. Please see a copy of my visit note below.    CHANDAN  CHIEF COMPLAINT   It was my pleasure to see Xu Rush who is a 69 year old male for follow-up of prostate cancer .      HPI   Xu Rush is a very pleasant 69 year old male who presents with a history of prostate cancer. He was seen by Dr. Ulloa on 10/16/19 and referred for surgical consultation. He has history of Celio 4+3=7 prostate cancer - pathology report not available. Has TRUS/Bx with Dr. Cruz in 11/2018 which demonstrated prostate cancer in the right mid gland and right base with benign DEREK and evidence of 184cc gland. PSA 13.1 at that time. He saw Dr. Villalpando who recommended trial of finasteride 5mg (Proscar) to shrink the prostate. Unfortunately, finasteride caused tinnitus and the patient stopped the medication. CT and bone scan from 11/2018 without evidence of mets, but a 1.2cm left renal mass.     No LUTS and low PVR     11/20/19:  His MRI of the prostate did demonstrate 184 g prostate with a 1.6 cm lymph node versus locally advanced disease in the right base.  I had discussed with several of my colleagues given his challenging case and recommendation for neoadjuvant androgen blockade with Lupron and reassessment in about 3 months.  He received a 3-month depot injection of Lupron    TODAY:  He presents today for follow-up following a repeat MRI  Fall on 1/9/20 walking his dog and broke 4 ribs. Still doing PT and feeling some effects.   He is eager to have surgery completed    PHYSICAL EXAM  Patient is a 69 year old  male   Vitals: Blood pressure 116/60, pulse 86, height 1.778 m (5' 10\"), weight 76.2 kg (168 lb), SpO2 98 %.  General Appearance Adult: Body mass index is 24.11 " kg/m .  Alert, no acute distress, oriented  HENT: throat/mouth:normal, good dentition  Lungs: no respiratory distress, or pursed lip breathing  Heart: No obvious jugular venous distension present  Abdomen: soft, nontender, no organomegaly or masses  Musculoskeltal: extremities normal, no peripheral edema  Skin: no suspicious lesions or rashes  Neuro: Alert, oriented, speech and mentation normal  Psych: affect and mood normal  Gait: Normal    UA RESULTS:  Recent Labs   Lab Test 10/16/19  1308   COLOR Yellow   APPEARANCE Clear   URINEGLC Negative   URINEBILI Negative   URINEKETONE Negative   SG 1.010   UBLD Negative   URINEPH 7.0   PROTEIN Negative   UROBILINOGEN 0.2   NITRITE Negative   LEUKEST Trace*     PATHOLOGY:   TRUS/BX 11/12/18 BY DR. BUSCH  A. LEFT BASE - NO EVIDENCE OF DISEASE   B. LEFT MID - NO EVIDENCE OF DISEASE   C. LEFT APEX - NO EVIDENCE OF DISEASE   D. RIGHT BASE - [place] 4+3=7 in 2/2 cores, involving 5-10%  E. RIGHT MID - [place] 3+3=6 in 1/2 cores, involving 5%  F. RIGHT APEX - NO EVIDENCE OF DISEASE     IMAGING:  All pertinent imaging reviewed:    All imaging studies reviewed by me.  I personally reviewed these imaging films.  A formal report from radiology will follow.    NM BONE SCAN 10/30/19:  FINDINGS: There is a moderate-sized focus of moderately intense  increased radiotracer uptake within the right medial posterior pelvis  adjacent to the sacroiliac joint. This would correspond to the area of  sclerosis seen in the right iliac bone on the CT earlier today. There  are a few scattered small areas of increased radiotracer uptake within  bilateral ribs. This includes two adjacent left posterior mid to lower  ribs, possibly the eighth and ninth ribs. Their adjacent location  would suggest this is posttraumatic. There is an additional area of  increased uptake in the posterior aspect of a right upper rib, likely  the fourth rib. This is indeterminate. There is degenerative uptake  within both  wrists and to a mild degree the right knee. No other  abnormal osseous uptake is seen. Normal soft tissue distribution is  noted.                                                                  IMPRESSION:   1. Increased radiotracer uptake within the right iliac bone adjacent  to the sacroiliac joint corresponding to the area of sclerosis on the  CT. In a patient with prostate cancer, metastatic disease needs to be  considered. If definitive diagnosis is needed, a biopsy could be  considered.  2. Small focus of increased uptake in the right posterior fourth rib.  This is an indeterminate lesion, but metastatic disease cannot be  excluded.  3. Probable posttraumatic changes of two adjacent left lower ribs and  degenerative changes elsewhere as described above.   4. No abnormal uptake is seen within the left iliac bone corresponding  to the area of lucency on the CT. The CT finding is, therefore, of  doubtful clinical significance.    CT ABD/PEL 10/30/19:  FINDINGS:   Lung bases: Lung bases are clear. No pleural effusion or pericardial  effusion.     Abdomen: Scattered hypodensities are noted throughout the liver, one  at the hepatic dome represents a simple cyst, the rest are too small  to characterize. Scattered subcentimeter hypodensities are noted  throughout both kidneys, too small to characterize. The spleen,  adrenal glands, and pancreas show no focal abnormality. Postoperative  changes of cholecystectomy. No intrahepatic or extrahepatic biliary  dilatation. Hepatic and portal veins are patent. No intraperitoneal  free air or free fluid.     Small and large bowel are normal in caliber without evidence of  obstruction. The prostate is heterogeneous and enlarged measuring 8.3  x 6.5 cm. The prostate is causing mass effect on the posterior aspect  of the bladder. Prominent lymph node is seen posterior and to the  right of the prostate measuring 1.6 x 1.2 cm (series 5, image 67).  Abdominal aortic aneurysm.  Nonenlarged bilateral periaortic lymph  nodes are noted.     Bones: Ill-defined sclerotic lesion in the right iliac bone measuring  2.9 cm (series 5, image 31). There are two lytic lesions in the left  iliac bone measuring 1.3 cm and 1.1 cm (series 5, image 31). These  will be better evaluated on bone scan from same day.                                                                   IMPRESSION:  1. Heterogeneous enlarged prostate.  2. Prominent lymph node along the posterior right aspect of the  prostate measuring 1.6 x 1.2 cm, metastatic disease cannot be  excluded.  3. Indeterminant sclerotic lesion in the right iliac bone and lytic  lesions in the left iliac bone, these will be better evaluated on the  nuclear medicine bone scan from today.    MRI PROSTATE 3/9/20:  IMPRESSION:In this patient with biopsy-proven [place] 3+4= 7 prostate  cancer from the right prostate base status post 3 month of ADT;     1. Due to the prior treatment, PIRADS scoring can not be applied. No  suspicious lesion which shows restricted diffusion, abnormal masslike  T2 hyperintensity or enhancement to indicate a high-grade malignancy.  2. Markedly enlarged prostate.  3. Previously described asymmetric appearance right posterior lateral  to the prostate is favored to represent prominent vasculature right  posterior lateral to the prostate. 4. Left internal iliac round shaped  1 cm lymph node which is indeterminant but may represent metastatic  lymphadenopathy.    ASSESSMENT and PLAN  69 year old man with PSA 13.1, [place] 4+3=7 prostate cancer with enlarge gland at 184cc and CT with 1.6cm lymph node vs locally advanced disease on the right base.  He is now status post 3 months of ADT with a repeat MRI that demonstrates decreased prostate size to 133 g with the irregular right base now favoring prominent vasculature rather than advanced disease.    Prostate cancer  -We reviewed his labs and imaging and again discussed the challenges of  his case given his large prostate size.  However given his good response to ADT I do think that proceeding on with a robotic prostatectomy and bilateral pelvic lymph node dissection is reasonable  -We discussed the risks of surgery.  Specifically we discussed risk for infection, bleeding, injury to surrounding structures, postoperative urinary incontinence, and postoperative erectile dysfunction.  We discussed that given the enlarged prostate size he would be at increased risk for a anastomotic leak or urinary incontinence.  -Orders for surgery placed    LEFT renal mass  - on updated imaging from 10/30/19 the small renal hypodensities were too small characterize  - We discussed that this is reassuring and that we will obtain follow up surveillance imaging in the future,     I spent over 25 minutes with the patient.  Over half this time was spent on counseling regarding the above.    Je Nolen MD   Urology  [facility]  Clinic Phone [phone]

## 2020-03-11 NOTE — PROGRESS NOTES
"[place]  CHIEF COMPLAINT   It was my pleasure to see Xu Rush who is a 69 year old male for follow-up of prostate cancer .      HPI   Xu Rush is a very pleasant 69 year old male who presents with a history of prostate cancer. He was seen by Dr. Ulloa on 10/16/19 and referred for surgical consultation. He has history of Celio 4+3=7 prostate cancer - pathology report not available. Has TRUS/Bx with Dr. Cruz in 11/2018 which demonstrated prostate cancer in the right mid gland and right base with benign DEREK and evidence of 184cc gland. PSA 13.1 at that time. He saw Dr. Villalpando who recommended trial of finasteride 5mg (Proscar) to shrink the prostate. Unfortunately, finasteride caused tinnitus and the patient stopped the medication. CT and bone scan from 11/2018 without evidence of mets, but a 1.2cm left renal mass.     No LUTS and low PVR     11/20/19:  His MRI of the prostate did demonstrate 184 g prostate with a 1.6 cm lymph node versus locally advanced disease in the right base.  I had discussed with several of my colleagues given his challenging case and recommendation for neoadjuvant androgen blockade with Lupron and reassessment in about 3 months.  He received a 3-month depot injection of Lupron    TODAY:  He presents today for follow-up following a repeat MRI  Fall on 1/9/20 walking his dog and broke 4 ribs. Still doing PT and feeling some effects.   He is eager to have surgery completed    PHYSICAL EXAM  Patient is a 69 year old  male   Vitals: Blood pressure 116/60, pulse 86, height 1.778 m (5' 10\"), weight 76.2 kg (168 lb), SpO2 98 %.  General Appearance Adult: Body mass index is 24.11 kg/m .  Alert, no acute distress, oriented  HENT: throat/mouth:normal, good dentition  Lungs: no respiratory distress, or pursed lip breathing  Heart: No obvious jugular venous distension present  Abdomen: soft, nontender, no organomegaly or masses  Musculoskeltal: extremities normal, no peripheral edema  Skin: " no suspicious lesions or rashes  Neuro: Alert, oriented, speech and mentation normal  Psych: affect and mood normal  Gait: Normal    UA RESULTS:  Recent Labs   Lab Test 10/16/19  1308   COLOR Yellow   APPEARANCE Clear   URINEGLC Negative   URINEBILI Negative   URINEKETONE Negative   SG 1.010   UBLD Negative   URINEPH 7.0   PROTEIN Negative   UROBILINOGEN 0.2   NITRITE Negative   LEUKEST Trace*     PATHOLOGY:   TRUS/BX 11/12/18 BY DR. BUSCH  A. LEFT BASE - NO EVIDENCE OF DISEASE   B. LEFT MID - NO EVIDENCE OF DISEASE   C. LEFT APEX - NO EVIDENCE OF DISEASE   D. RIGHT BASE - [place] 4+3=7 in 2/2 cores, involving 5-10%  E. RIGHT MID - Celio 3+3=6 in 1/2 cores, involving 5%  F. RIGHT APEX - NO EVIDENCE OF DISEASE     IMAGING:  All pertinent imaging reviewed:    All imaging studies reviewed by me.  I personally reviewed these imaging films.  A formal report from radiology will follow.    NM BONE SCAN 10/30/19:  FINDINGS: There is a moderate-sized focus of moderately intense  increased radiotracer uptake within the right medial posterior pelvis  adjacent to the sacroiliac joint. This would correspond to the area of  sclerosis seen in the right iliac bone on the CT earlier today. There  are a few scattered small areas of increased radiotracer uptake within  bilateral ribs. This includes two adjacent left posterior mid to lower  ribs, possibly the eighth and ninth ribs. Their adjacent location  would suggest this is posttraumatic. There is an additional area of  increased uptake in the posterior aspect of a right upper rib, likely  the fourth rib. This is indeterminate. There is degenerative uptake  within both wrists and to a mild degree the right knee. No other  abnormal osseous uptake is seen. Normal soft tissue distribution is  noted.                                                                  IMPRESSION:   1. Increased radiotracer uptake within the right iliac bone adjacent  to the sacroiliac joint  corresponding to the area of sclerosis on the  CT. In a patient with prostate cancer, metastatic disease needs to be  considered. If definitive diagnosis is needed, a biopsy could be  considered.  2. Small focus of increased uptake in the right posterior fourth rib.  This is an indeterminate lesion, but metastatic disease cannot be  excluded.  3. Probable posttraumatic changes of two adjacent left lower ribs and  degenerative changes elsewhere as described above.   4. No abnormal uptake is seen within the left iliac bone corresponding  to the area of lucency on the CT. The CT finding is, therefore, of  doubtful clinical significance.    CT ABD/PEL 10/30/19:  FINDINGS:   Lung bases: Lung bases are clear. No pleural effusion or pericardial  effusion.     Abdomen: Scattered hypodensities are noted throughout the liver, one  at the hepatic dome represents a simple cyst, the rest are too small  to characterize. Scattered subcentimeter hypodensities are noted  throughout both kidneys, too small to characterize. The spleen,  adrenal glands, and pancreas show no focal abnormality. Postoperative  changes of cholecystectomy. No intrahepatic or extrahepatic biliary  dilatation. Hepatic and portal veins are patent. No intraperitoneal  free air or free fluid.     Small and large bowel are normal in caliber without evidence of  obstruction. The prostate is heterogeneous and enlarged measuring 8.3  x 6.5 cm. The prostate is causing mass effect on the posterior aspect  of the bladder. Prominent lymph node is seen posterior and to the  right of the prostate measuring 1.6 x 1.2 cm (series 5, image 67).  Abdominal aortic aneurysm. Nonenlarged bilateral periaortic lymph  nodes are noted.     Bones: Ill-defined sclerotic lesion in the right iliac bone measuring  2.9 cm (series 5, image 31). There are two lytic lesions in the left  iliac bone measuring 1.3 cm and 1.1 cm (series 5, image 31). These  will be better evaluated on bone scan  from same day.                                                                   IMPRESSION:  1. Heterogeneous enlarged prostate.  2. Prominent lymph node along the posterior right aspect of the  prostate measuring 1.6 x 1.2 cm, metastatic disease cannot be  excluded.  3. Indeterminant sclerotic lesion in the right iliac bone and lytic  lesions in the left iliac bone, these will be better evaluated on the  nuclear medicine bone scan from today.    MRI PROSTATE 3/9/20:  IMPRESSION:In this patient with biopsy-proven [place]io 3+4= 7 prostate  cancer from the right prostate base status post 3 month of ADT;     1. Due to the prior treatment, PIRADS scoring can not be applied. No  suspicious lesion which shows restricted diffusion, abnormal masslike  T2 hyperintensity or enhancement to indicate a high-grade malignancy.  2. Markedly enlarged prostate.  3. Previously described asymmetric appearance right posterior lateral  to the prostate is favored to represent prominent vasculature right  posterior lateral to the prostate. 4. Left internal iliac round shaped  1 cm lymph node which is indeterminant but may represent metastatic  lymphadenopathy.    ASSESSMENT and PLAN  69 year old man with PSA 13.1, Celio 4+3=7 prostate cancer with enlarge gland at 184cc and CT with 1.6cm lymph node vs locally advanced disease on the right base.  He is now status post 3 months of ADT with a repeat MRI that demonstrates decreased prostate size to 133 g with the irregular right base now favoring prominent vasculature rather than advanced disease.    Prostate cancer  -We reviewed his labs and imaging and again discussed the challenges of his case given his large prostate size.  However given his good response to ADT I do think that proceeding on with a robotic prostatectomy and bilateral pelvic lymph node dissection is reasonable  -We discussed the risks of surgery.  Specifically we discussed risk for infection, bleeding, injury to  surrounding structures, postoperative urinary incontinence, and postoperative erectile dysfunction.  We discussed that given the enlarged prostate size he would be at increased risk for a anastomotic leak or urinary incontinence.  -Orders for surgery placed    LEFT renal mass  - on updated imaging from 10/30/19 the small renal hypodensities were too small characterize  - We discussed that this is reassuring and that we will obtain follow up surveillance imaging in the future,     I spent over 25 minutes with the patient.  Over half this time was spent on counseling regarding the above.    Je Nolen MD   Urology  [facility]  Clinic Phone [phone]

## 2020-03-11 NOTE — NURSING NOTE
Chief Complaint   Patient presents with     Prostate Cancer     Patient here today for Result of MRI

## 2020-03-25 ENCOUNTER — TELEPHONE (OUTPATIENT)
Dept: UROLOGY | Facility: CLINIC | Age: 70
End: 2020-03-25

## 2020-03-25 DIAGNOSIS — N40.0 BPH (BENIGN PROSTATIC HYPERPLASIA): ICD-10-CM

## 2020-03-25 RX ORDER — TAMSULOSIN HYDROCHLORIDE 0.4 MG/1
0.4 CAPSULE ORAL DAILY
Qty: 90 CAPSULE | Refills: 3 | Status: SHIPPED | OUTPATIENT
Start: 2020-03-25 | End: 2021-03-25

## 2020-03-25 NOTE — TELEPHONE ENCOUNTER
Xu Rush  [id number]  March 25, 2020  10:17 AM    69 year old man with PSA 13.1, [place] 4+3=7 prostate cancer with enlarge gland at 184cc and CT with 1.6cm lymph node vs locally advanced disease on the right base.  He is now status post 3 months of ADT with a repeat MRI that demonstrates decreased prostate size to 133 g with the irregular right base now favoring prominent vasculature rather than advanced disease.    I called him today to discuss the timing of his surgery.  He had initially been scheduled for a robotic prostatectomy on April 1.  However given the COVID-19 pandemic, we discussed that his safest course of action would be to delay the surgery 3 months given his intermediate risk category grouping as well as his age over 70.  We will plan for a re-dosing of Lupron 3-month injection tomorrow and will tentatively plan for surgery in early July.  We discussed that he should continue on the tamsulosin and this medication was refilled.    It is my professional judgment, in conjunction with the current COVID-19 pandemic recommended restrictions on elective procedures, that this procedure can safely be deferred until a later date which may be beyond the typical treatment period/window. I have engaged in a discussion with the patient (and family) about the need for this deferral, explained why the procedure can be safely deferred, the potential risks associated with the deferral, and answered all of the patients questions. The patient has also been advised that if there is a change in their condition/symptoms they should notify me, my clinic/facility, and/or seek appropriate medical care. That patient has also been informed that the decision to defer this procedure can be re-evaluated if there is a change in their condition/symptoms. I have also told the patient they have the right to seek a second opinion on the decision to defer their procedure.      Plan  - 3 month Lupron tomorrow at 10 am  - tamsulosin  0.4mg (Flomax) refilled  - Plan for surgery in July    Call time 8 minutes.     Je Nolen M.D.

## 2020-03-26 ENCOUNTER — ALLIED HEALTH/NURSE VISIT (OUTPATIENT)
Dept: UROLOGY | Facility: CLINIC | Age: 70
End: 2020-03-26
Payer: COMMERCIAL

## 2020-03-26 DIAGNOSIS — C61 MALIGNANT NEOPLASM OF PROSTATE (H): Primary | ICD-10-CM

## 2020-03-26 PROCEDURE — 96402 CHEMO HORMON ANTINEOPL SQ/IM: CPT | Performed by: UROLOGY

## 2020-03-26 NOTE — PROGRESS NOTES
The following medication was given:     MEDICATION:  Lupron Depot 22.5 mg  ROUTE: IM  SITE: LUQ - Abd  DOSE: 22.5mg  LOT #: [id number]  : Plastyc  EXPIRATION DATE: 06/12/2022  NDC#: 8953-1118-21  Was there drug waste? No  Multi-dose vial: No    Milly Flores LPN  March 26, 2020

## 2020-05-02 DIAGNOSIS — Z20.822 ENCOUNTER FOR LABORATORY TESTING FOR COVID-19 VIRUS: Primary | ICD-10-CM

## 2020-06-02 DIAGNOSIS — Z20.822 ENCOUNTER FOR LABORATORY TESTING FOR COVID-19 VIRUS: ICD-10-CM

## 2020-06-02 PROCEDURE — 99000 SPECIMEN HANDLING OFFICE-LAB: CPT | Performed by: UROLOGY

## 2020-06-02 PROCEDURE — U0003 INFECTIOUS AGENT DETECTION BY NUCLEIC ACID (DNA OR RNA); SEVERE ACUTE RESPIRATORY SYNDROME CORONAVIRUS 2 (SARS-COV-2) (CORONAVIRUS DISEASE [COVID-19]), AMPLIFIED PROBE TECHNIQUE, MAKING USE OF HIGH THROUGHPUT TECHNOLOGIES AS DESCRIBED BY CMS-2020-01-R: HCPCS | Mod: 90 | Performed by: UROLOGY

## 2020-06-02 PROCEDURE — 99207 ZZC NO BILLABLE SERVICE THIS VISIT: CPT

## 2020-06-03 LAB
SARS-COV-2 RNA SPEC QL NAA+PROBE: NOT DETECTED
SPECIMEN SOURCE: NORMAL

## 2020-06-04 ENCOUNTER — ANESTHESIA EVENT (OUTPATIENT)
Dept: SURGERY | Facility: CLINIC | Age: 70
End: 2020-06-04
Payer: COMMERCIAL

## 2020-06-04 RX ORDER — FERROUS SULFATE 325(65) MG
325 TABLET ORAL DAILY
COMMUNITY

## 2020-06-04 RX ORDER — NYSTATIN 100000 U/G
CREAM TOPICAL DAILY PRN
COMMUNITY

## 2020-06-04 RX ORDER — TRIAMCINOLONE ACETONIDE 0.25 MG/G
CREAM TOPICAL 2 TIMES DAILY PRN
COMMUNITY

## 2020-06-04 SDOH — HEALTH STABILITY: MENTAL HEALTH: CURRENT SMOKER: 0

## 2020-06-04 ASSESSMENT — LIFESTYLE VARIABLES: TOBACCO_USE: 0

## 2020-06-04 NOTE — ANESTHESIA PREPROCEDURE EVALUATION
Anesthesia Pre-Procedure Evaluation    Patient: Xu Rush   MRN: [id number] : 1950          Preoperative Diagnosis: Prostate cancer (H) [C61]    Procedure(s):  ROBOTIC ASSISTED LAPAROSCOPIC RADICAL PROSTATECTOMY WITH BILATERAL PELVIC LYMPH NODE DISSECTION - POSSIBLE OPEN    Past Medical History:   Diagnosis Date     Hernia, abdominal      Past Surgical History:   Procedure Laterality Date     CYSTOSCOPY       HERNIA REPAIR         Anesthesia Evaluation     . Pt has had prior anesthetic.            ROS/MED HX    ENT/Pulmonary:      (-) tobacco use   Neurologic:       Cardiovascular:     (+) Dyslipidemia, hypertension--CAD, --stent,2011 Drug Eluting Stent .. Taking blood thinners : Instructions Given to patient: Patient stopped ASA x 10 days. . . . :. .      (-) angina and angina   METS/Exercise Tolerance:     Hematologic:         Musculoskeletal:         GI/Hepatic:        (-) GERD   Renal/Genitourinary:         Endo:      (-) Type I DM, Type II DM and obesity   Psychiatric:         Infectious Disease:         Malignancy:   (+) Malignancy History of Prostate  Prostate CA Active status post Surgery,         Other:                          Physical Exam  Normal systems: dental    Airway   Mallampati: II    Dental     Cardiovascular   Rhythm and rate: regular  (-) no murmur    Pulmonary    breath sounds clear to auscultation            No results found for: WBC, HGB, HCT, PLT, CRP, SED, NA, POTASSIUM, CHLORIDE, CO2, BUN, CR, GLC, GUILLAUME, PHOS, MAG, ALBUMIN, PROTTOTAL, ALT, AST, GGT, ALKPHOS, BILITOTAL, BILIDIRECT, LIPASE, AMYLASE, ODESSA, PTT, INR, FIBR, TSH, T4, T3, HCG, HCGS, CKTOTAL, CKMB, TROPN    Preop Vitals  BP Readings from Last 3 Encounters:   20 116/60   19 (!) 140/80   19 (!) 142/68    Pulse Readings from Last 3 Encounters:   20 86   19 69   19 60      Resp Readings from Last 3 Encounters:   No data found for Resp    SpO2 Readings from Last 3 Encounters:  "  03/11/20 98%   11/20/19 97%      Temp Readings from Last 1 Encounters:   No data found for Temp    Ht Readings from Last 1 Encounters:   03/11/20 1.778 m (5' 10\")      Wt Readings from Last 1 Encounters:   03/11/20 76.2 kg (168 lb)    Estimated body mass index is 24.11 kg/m  as calculated from the following:    Height as of 3/11/20: 1.778 m (5' 10\").    Weight as of 3/11/20: 76.2 kg (168 lb).       Anesthesia Plan      History & Physical Review  History and physical reviewed and following examination; no interval change.    ASA Status:  2 .    NPO Status:  > 8 hours    Plan for General with Intravenous induction. Maintenance will be Balanced.    PONV prophylaxis:  Ondansetron (or other 5HT-3)    The patient is not a current smoker      Postoperative Care  Postoperative pain management:  Multi-modal analgesia.      Consents  Anesthetic plan, risks, benefits and alternatives discussed with:  Patient.  Use of blood products discussed: Yes.   Use of blood products discussed with Patient.  Consented to blood products.  .                 Fredo Aly MD  "

## 2020-06-05 ENCOUNTER — ANESTHESIA (OUTPATIENT)
Dept: SURGERY | Facility: CLINIC | Age: 70
End: 2020-06-05
Payer: COMMERCIAL

## 2020-06-05 ENCOUNTER — HOSPITAL ENCOUNTER (OUTPATIENT)
Facility: CLINIC | Age: 70
Discharge: HOME | End: 2020-06-06
Attending: UROLOGY | Admitting: UROLOGY
Payer: COMMERCIAL

## 2020-06-05 DIAGNOSIS — C61 MALIGNANT NEOPLASM OF PROSTATE (H): Primary | ICD-10-CM

## 2020-06-05 DIAGNOSIS — C61 PROSTATE CANCER (H): ICD-10-CM

## 2020-06-05 LAB
ANION GAP SERPL CALCULATED.3IONS-SCNC: 5 MMOL/L (ref 3–14)
BUN SERPL-MCNC: 18 MG/DL (ref 7–30)
CALCIUM SERPL-MCNC: 8.5 MG/DL (ref 8.5–10.1)
CHLORIDE SERPL-SCNC: 106 MMOL/L (ref 94–109)
CO2 SERPL-SCNC: 26 MMOL/L (ref 20–32)
CREAT SERPL-MCNC: 0.98 MG/DL (ref 0.66–1.25)
ERYTHROCYTE [DISTWIDTH] IN BLOOD BY AUTOMATED COUNT: 13.1 % (ref 10–15)
GFR SERPL CREATININE-BSD FRML MDRD: 77 ML/MIN/{1.73_M2}
GLUCOSE SERPL-MCNC: 178 MG/DL (ref 70–99)
HCT VFR BLD AUTO: 37.5 % (ref 40–53)
HGB BLD-MCNC: 12.6 G/DL (ref 13.3–17.7)
MCH RBC QN AUTO: 30.4 PG (ref 26.5–33)
MCHC RBC AUTO-ENTMCNC: 33.6 G/DL (ref 31.5–36.5)
MCV RBC AUTO: 90 FL (ref 78–100)
PLATELET # BLD AUTO: 211 10E9/L (ref 150–450)
POTASSIUM SERPL-SCNC: 4.2 MMOL/L (ref 3.4–5.3)
RBC # BLD AUTO: 4.15 10E12/L (ref 4.4–5.9)
SODIUM SERPL-SCNC: 137 MMOL/L (ref 133–144)
WBC # BLD AUTO: 11.5 10E9/L (ref 4–11)

## 2020-06-05 PROCEDURE — 55866 LAPS SURG PRST8ECT RPBIC RAD: CPT | Performed by: UROLOGY

## 2020-06-05 PROCEDURE — 36000085 ZZH SURGERY LEVEL 8 1ST 30 MIN: Performed by: UROLOGY

## 2020-06-05 PROCEDURE — 85027 COMPLETE CBC AUTOMATED: CPT | Performed by: UROLOGY

## 2020-06-05 PROCEDURE — 38571 LAPAROSCOPY LYMPHADENECTOMY: CPT | Mod: 51 | Performed by: UROLOGY

## 2020-06-05 PROCEDURE — 88342 IMHCHEM/IMCYTCHM 1ST ANTB: CPT | Performed by: UROLOGY

## 2020-06-05 PROCEDURE — 25800030 ZZH RX IP 258 OP 636: Performed by: NURSE ANESTHETIST, CERTIFIED REGISTERED

## 2020-06-05 PROCEDURE — 88342 IMHCHEM/IMCYTCHM 1ST ANTB: CPT | Mod: 26 | Performed by: UROLOGY

## 2020-06-05 PROCEDURE — 25000128 H RX IP 250 OP 636: Performed by: ANESTHESIOLOGY

## 2020-06-05 PROCEDURE — 88309 TISSUE EXAM BY PATHOLOGIST: CPT | Mod: 26 | Performed by: UROLOGY

## 2020-06-05 PROCEDURE — 25000128 H RX IP 250 OP 636: Performed by: NURSE ANESTHETIST, CERTIFIED REGISTERED

## 2020-06-05 PROCEDURE — 36415 COLL VENOUS BLD VENIPUNCTURE: CPT | Performed by: UROLOGY

## 2020-06-05 PROCEDURE — 25800025 ZZH RX 258: Performed by: UROLOGY

## 2020-06-05 PROCEDURE — 25000125 ZZHC RX 250: Performed by: UROLOGY

## 2020-06-05 PROCEDURE — 25000132 ZZH RX MED GY IP 250 OP 250 PS 637: Performed by: UROLOGY

## 2020-06-05 PROCEDURE — 25000566 ZZH SEVOFLURANE, EA 15 MIN: Performed by: UROLOGY

## 2020-06-05 PROCEDURE — S2900 ROBOTIC SURGICAL SYSTEM: HCPCS | Performed by: UROLOGY

## 2020-06-05 PROCEDURE — 88309 TISSUE EXAM BY PATHOLOGIST: CPT | Performed by: UROLOGY

## 2020-06-05 PROCEDURE — 37000009 ZZH ANESTHESIA TECHNICAL FEE, EACH ADDTL 15 MIN: Performed by: UROLOGY

## 2020-06-05 PROCEDURE — 25800030 ZZH RX IP 258 OP 636: Performed by: UROLOGY

## 2020-06-05 PROCEDURE — 40000170 ZZH STATISTIC PRE-PROCEDURE ASSESSMENT II: Performed by: UROLOGY

## 2020-06-05 PROCEDURE — 36000087 ZZH SURGERY LEVEL 8 EA 15 ADDTL MIN: Performed by: UROLOGY

## 2020-06-05 PROCEDURE — 80048 BASIC METABOLIC PNL TOTAL CA: CPT | Performed by: UROLOGY

## 2020-06-05 PROCEDURE — 88307 TISSUE EXAM BY PATHOLOGIST: CPT | Performed by: UROLOGY

## 2020-06-05 PROCEDURE — 37000008 ZZH ANESTHESIA TECHNICAL FEE, 1ST 30 MIN: Performed by: UROLOGY

## 2020-06-05 PROCEDURE — 88307 TISSUE EXAM BY PATHOLOGIST: CPT | Mod: 26 | Performed by: UROLOGY

## 2020-06-05 PROCEDURE — 25000128 H RX IP 250 OP 636: Performed by: UROLOGY

## 2020-06-05 PROCEDURE — 27210794 ZZH OR GENERAL SUPPLY STERILE: Performed by: UROLOGY

## 2020-06-05 PROCEDURE — 71000012 ZZH RECOVERY PHASE 1 LEVEL 1 FIRST HR: Performed by: UROLOGY

## 2020-06-05 PROCEDURE — 25000125 ZZHC RX 250: Performed by: NURSE ANESTHETIST, CERTIFIED REGISTERED

## 2020-06-05 RX ORDER — SODIUM CHLORIDE 9 MG/ML
INJECTION, SOLUTION INTRAVENOUS CONTINUOUS
Status: DISCONTINUED | OUTPATIENT
Start: 2020-06-05 | End: 2020-06-06

## 2020-06-05 RX ORDER — ONDANSETRON 4 MG/1
4 TABLET, ORALLY DISINTEGRATING ORAL EVERY 30 MIN PRN
Status: DISCONTINUED | OUTPATIENT
Start: 2020-06-05 | End: 2020-06-05

## 2020-06-05 RX ORDER — CEFAZOLIN SODIUM 1 G/3ML
1 INJECTION, POWDER, FOR SOLUTION INTRAMUSCULAR; INTRAVENOUS SEE ADMIN INSTRUCTIONS
Status: DISCONTINUED | OUTPATIENT
Start: 2020-06-05 | End: 2020-06-05 | Stop reason: HOSPADM

## 2020-06-05 RX ORDER — FENTANYL CITRATE 50 UG/ML
25-50 INJECTION, SOLUTION INTRAMUSCULAR; INTRAVENOUS
Status: DISCONTINUED | OUTPATIENT
Start: 2020-06-05 | End: 2020-06-05

## 2020-06-05 RX ORDER — NALOXONE HYDROCHLORIDE 0.4 MG/ML
.1-.4 INJECTION, SOLUTION INTRAMUSCULAR; INTRAVENOUS; SUBCUTANEOUS
Status: DISCONTINUED | OUTPATIENT
Start: 2020-06-05 | End: 2020-06-06 | Stop reason: HOSPADM

## 2020-06-05 RX ORDER — LIDOCAINE HYDROCHLORIDE 20 MG/ML
INJECTION, SOLUTION INFILTRATION; PERINEURAL PRN
Status: DISCONTINUED | OUTPATIENT
Start: 2020-06-05 | End: 2020-06-05

## 2020-06-05 RX ORDER — FENTANYL CITRATE 50 UG/ML
INJECTION, SOLUTION INTRAMUSCULAR; INTRAVENOUS PRN
Status: DISCONTINUED | OUTPATIENT
Start: 2020-06-05 | End: 2020-06-05

## 2020-06-05 RX ORDER — NEOSTIGMINE METHYLSULFATE 1 MG/ML
VIAL (ML) INJECTION PRN
Status: DISCONTINUED | OUTPATIENT
Start: 2020-06-05 | End: 2020-06-05

## 2020-06-05 RX ORDER — NALOXONE HYDROCHLORIDE 0.4 MG/ML
.1-.4 INJECTION, SOLUTION INTRAMUSCULAR; INTRAVENOUS; SUBCUTANEOUS
Status: ACTIVE | OUTPATIENT
Start: 2020-06-05 | End: 2020-06-06

## 2020-06-05 RX ORDER — ASPIRIN 81 MG
100 TABLET, DELAYED RELEASE (ENTERIC COATED) ORAL DAILY
Qty: 60 TABLET | Refills: 1 | Status: SHIPPED | OUTPATIENT
Start: 2020-06-05 | End: 2021-04-05

## 2020-06-05 RX ORDER — PROPOFOL 10 MG/ML
INJECTION, EMULSION INTRAVENOUS PRN
Status: DISCONTINUED | OUTPATIENT
Start: 2020-06-05 | End: 2020-06-05

## 2020-06-05 RX ORDER — DOCUSATE SODIUM 100 MG/1
100 CAPSULE, LIQUID FILLED ORAL 2 TIMES DAILY
Status: DISCONTINUED | OUTPATIENT
Start: 2020-06-05 | End: 2020-06-06 | Stop reason: HOSPADM

## 2020-06-05 RX ORDER — OXYCODONE HYDROCHLORIDE 5 MG/1
5 TABLET ORAL EVERY 6 HOURS PRN
Qty: 9 TABLET | Refills: 0 | Status: SHIPPED | OUTPATIENT
Start: 2020-06-05 | End: 2021-04-05

## 2020-06-05 RX ORDER — HEPARIN SODIUM 5000 [USP'U]/.5ML
5000 INJECTION, SOLUTION INTRAVENOUS; SUBCUTANEOUS
Status: COMPLETED | OUTPATIENT
Start: 2020-06-05 | End: 2020-06-05

## 2020-06-05 RX ORDER — SODIUM CHLORIDE, SODIUM LACTATE, POTASSIUM CHLORIDE, CALCIUM CHLORIDE 600; 310; 30; 20 MG/100ML; MG/100ML; MG/100ML; MG/100ML
INJECTION, SOLUTION INTRAVENOUS CONTINUOUS PRN
Status: DISCONTINUED | OUTPATIENT
Start: 2020-06-05 | End: 2020-06-05

## 2020-06-05 RX ORDER — CIPROFLOXACIN 500 MG/1
500 TABLET, FILM COATED ORAL 2 TIMES DAILY
Qty: 6 TABLET | Refills: 0 | Status: SHIPPED | OUTPATIENT
Start: 2020-06-05 | End: 2020-06-08

## 2020-06-05 RX ORDER — HYDROMORPHONE HYDROCHLORIDE 1 MG/ML
0.2 INJECTION, SOLUTION INTRAMUSCULAR; INTRAVENOUS; SUBCUTANEOUS
Status: DISCONTINUED | OUTPATIENT
Start: 2020-06-05 | End: 2020-06-06 | Stop reason: HOSPADM

## 2020-06-05 RX ORDER — KETOROLAC TROMETHAMINE 15 MG/ML
15 INJECTION, SOLUTION INTRAMUSCULAR; INTRAVENOUS EVERY 6 HOURS
Status: DISCONTINUED | OUTPATIENT
Start: 2020-06-05 | End: 2020-06-05

## 2020-06-05 RX ORDER — VECURONIUM BROMIDE 1 MG/ML
INJECTION, POWDER, LYOPHILIZED, FOR SOLUTION INTRAVENOUS PRN
Status: DISCONTINUED | OUTPATIENT
Start: 2020-06-05 | End: 2020-06-05

## 2020-06-05 RX ORDER — CEFAZOLIN SODIUM 2 G/100ML
2 INJECTION, SOLUTION INTRAVENOUS
Status: COMPLETED | OUTPATIENT
Start: 2020-06-05 | End: 2020-06-05

## 2020-06-05 RX ORDER — BUPIVACAINE HYDROCHLORIDE 2.5 MG/ML
INJECTION, SOLUTION INFILTRATION; PERINEURAL PRN
Status: DISCONTINUED | OUTPATIENT
Start: 2020-06-05 | End: 2020-06-05 | Stop reason: HOSPADM

## 2020-06-05 RX ORDER — MAGNESIUM HYDROXIDE 1200 MG/15ML
LIQUID ORAL PRN
Status: DISCONTINUED | OUTPATIENT
Start: 2020-06-05 | End: 2020-06-05 | Stop reason: HOSPADM

## 2020-06-05 RX ORDER — HEPARIN SODIUM 5000 [USP'U]/.5ML
5000 INJECTION, SOLUTION INTRAVENOUS; SUBCUTANEOUS EVERY 8 HOURS
Status: DISCONTINUED | OUTPATIENT
Start: 2020-06-05 | End: 2020-06-06 | Stop reason: HOSPADM

## 2020-06-05 RX ORDER — ACETAMINOPHEN 325 MG/1
975 TABLET ORAL EVERY 6 HOURS
Status: DISCONTINUED | OUTPATIENT
Start: 2020-06-05 | End: 2020-06-06

## 2020-06-05 RX ORDER — ONDANSETRON 2 MG/ML
INJECTION INTRAMUSCULAR; INTRAVENOUS PRN
Status: DISCONTINUED | OUTPATIENT
Start: 2020-06-05 | End: 2020-06-05

## 2020-06-05 RX ORDER — HYDROMORPHONE HYDROCHLORIDE 1 MG/ML
.3-.5 INJECTION, SOLUTION INTRAMUSCULAR; INTRAVENOUS; SUBCUTANEOUS EVERY 5 MIN PRN
Status: DISCONTINUED | OUTPATIENT
Start: 2020-06-05 | End: 2020-06-05

## 2020-06-05 RX ORDER — ONDANSETRON 4 MG/1
4 TABLET, ORALLY DISINTEGRATING ORAL EVERY 6 HOURS PRN
Status: DISCONTINUED | OUTPATIENT
Start: 2020-06-05 | End: 2020-06-06 | Stop reason: HOSPADM

## 2020-06-05 RX ORDER — DEXAMETHASONE SODIUM PHOSPHATE 4 MG/ML
INJECTION, SOLUTION INTRA-ARTICULAR; INTRALESIONAL; INTRAMUSCULAR; INTRAVENOUS; SOFT TISSUE PRN
Status: DISCONTINUED | OUTPATIENT
Start: 2020-06-05 | End: 2020-06-05

## 2020-06-05 RX ORDER — LIDOCAINE 40 MG/G
CREAM TOPICAL
Status: DISCONTINUED | OUTPATIENT
Start: 2020-06-05 | End: 2020-06-06 | Stop reason: HOSPADM

## 2020-06-05 RX ORDER — SODIUM CHLORIDE, SODIUM LACTATE, POTASSIUM CHLORIDE, CALCIUM CHLORIDE 600; 310; 30; 20 MG/100ML; MG/100ML; MG/100ML; MG/100ML
INJECTION, SOLUTION INTRAVENOUS CONTINUOUS
Status: DISCONTINUED | OUTPATIENT
Start: 2020-06-05 | End: 2020-06-05 | Stop reason: ALTCHOICE

## 2020-06-05 RX ORDER — ONDANSETRON 2 MG/ML
4 INJECTION INTRAMUSCULAR; INTRAVENOUS EVERY 6 HOURS PRN
Status: DISCONTINUED | OUTPATIENT
Start: 2020-06-05 | End: 2020-06-06 | Stop reason: HOSPADM

## 2020-06-05 RX ORDER — OXYBUTYNIN CHLORIDE 5 MG/1
5 TABLET, EXTENDED RELEASE ORAL DAILY
Qty: 14 TABLET | Refills: 0 | Status: SHIPPED | OUTPATIENT
Start: 2020-06-05 | End: 2020-06-19

## 2020-06-05 RX ORDER — EPHEDRINE SULFATE 50 MG/ML
INJECTION, SOLUTION INTRAMUSCULAR; INTRAVENOUS; SUBCUTANEOUS PRN
Status: DISCONTINUED | OUTPATIENT
Start: 2020-06-05 | End: 2020-06-05

## 2020-06-05 RX ORDER — OXYBUTYNIN CHLORIDE 5 MG/1
5 TABLET, EXTENDED RELEASE ORAL AT BEDTIME
Status: DISCONTINUED | OUTPATIENT
Start: 2020-06-05 | End: 2020-06-06 | Stop reason: HOSPADM

## 2020-06-05 RX ORDER — GLYCOPYRROLATE 0.2 MG/ML
INJECTION, SOLUTION INTRAMUSCULAR; INTRAVENOUS PRN
Status: DISCONTINUED | OUTPATIENT
Start: 2020-06-05 | End: 2020-06-05

## 2020-06-05 RX ORDER — PROPOFOL 10 MG/ML
INJECTION, EMULSION INTRAVENOUS CONTINUOUS PRN
Status: DISCONTINUED | OUTPATIENT
Start: 2020-06-05 | End: 2020-06-05

## 2020-06-05 RX ORDER — OXYCODONE HYDROCHLORIDE 5 MG/1
5-10 TABLET ORAL
Status: DISCONTINUED | OUTPATIENT
Start: 2020-06-05 | End: 2020-06-06 | Stop reason: HOSPADM

## 2020-06-05 RX ORDER — ONDANSETRON 2 MG/ML
4 INJECTION INTRAMUSCULAR; INTRAVENOUS EVERY 30 MIN PRN
Status: DISCONTINUED | OUTPATIENT
Start: 2020-06-05 | End: 2020-06-05

## 2020-06-05 RX ADMIN — NEOSTIGMINE METHYLSULFATE 4 MG: 1 INJECTION, SOLUTION INTRAVENOUS at 12:15

## 2020-06-05 RX ADMIN — VECURONIUM BROMIDE 2 MG: 1 INJECTION, POWDER, LYOPHILIZED, FOR SOLUTION INTRAVENOUS at 08:31

## 2020-06-05 RX ADMIN — VECURONIUM BROMIDE 2 MG: 1 INJECTION, POWDER, LYOPHILIZED, FOR SOLUTION INTRAVENOUS at 08:02

## 2020-06-05 RX ADMIN — FENTANYL CITRATE 50 MCG: 50 INJECTION, SOLUTION INTRAMUSCULAR; INTRAVENOUS at 07:28

## 2020-06-05 RX ADMIN — SODIUM CHLORIDE: 9 INJECTION, SOLUTION INTRAVENOUS at 23:27

## 2020-06-05 RX ADMIN — PHENYLEPHRINE HYDROCHLORIDE 100 MCG: 10 INJECTION INTRAVENOUS at 07:37

## 2020-06-05 RX ADMIN — PROPOFOL 140 MG: 10 INJECTION, EMULSION INTRAVENOUS at 07:28

## 2020-06-05 RX ADMIN — SODIUM CHLORIDE, POTASSIUM CHLORIDE, SODIUM LACTATE AND CALCIUM CHLORIDE: 600; 310; 30; 20 INJECTION, SOLUTION INTRAVENOUS at 07:23

## 2020-06-05 RX ADMIN — VECURONIUM BROMIDE 2 MG: 1 INJECTION, POWDER, LYOPHILIZED, FOR SOLUTION INTRAVENOUS at 10:01

## 2020-06-05 RX ADMIN — ACETAMINOPHEN 975 MG: 325 TABLET, FILM COATED ORAL at 23:26

## 2020-06-05 RX ADMIN — CEFAZOLIN SODIUM 1 G: 2 INJECTION, SOLUTION INTRAVENOUS at 09:32

## 2020-06-05 RX ADMIN — SODIUM CHLORIDE: 9 INJECTION, SOLUTION INTRAVENOUS at 14:18

## 2020-06-05 RX ADMIN — CEFAZOLIN SODIUM 2 G: 2 INJECTION, SOLUTION INTRAVENOUS at 07:32

## 2020-06-05 RX ADMIN — DEXAMETHASONE SODIUM PHOSPHATE 4 MG: 4 INJECTION, SOLUTION INTRA-ARTICULAR; INTRALESIONAL; INTRAMUSCULAR; INTRAVENOUS; SOFT TISSUE at 07:33

## 2020-06-05 RX ADMIN — FENTANYL CITRATE 50 MCG: 50 INJECTION, SOLUTION INTRAMUSCULAR; INTRAVENOUS at 10:49

## 2020-06-05 RX ADMIN — Medication 5 MG: at 07:54

## 2020-06-05 RX ADMIN — HEPARIN SODIUM 5000 UNITS: 5000 INJECTION, SOLUTION INTRAVENOUS; SUBCUTANEOUS at 18:47

## 2020-06-05 RX ADMIN — HEPARIN SODIUM 5000 UNITS: 10000 INJECTION, SOLUTION INTRAVENOUS; SUBCUTANEOUS at 07:37

## 2020-06-05 RX ADMIN — VECURONIUM BROMIDE 2 MG: 1 INJECTION, POWDER, LYOPHILIZED, FOR SOLUTION INTRAVENOUS at 10:30

## 2020-06-05 RX ADMIN — METOPROLOL TARTRATE 12.5 MG: 25 TABLET, FILM COATED ORAL at 20:38

## 2020-06-05 RX ADMIN — LIDOCAINE HYDROCHLORIDE 80 MG: 20 INJECTION, SOLUTION INFILTRATION; PERINEURAL at 07:28

## 2020-06-05 RX ADMIN — MIDAZOLAM 2 MG: 1 INJECTION INTRAMUSCULAR; INTRAVENOUS at 07:23

## 2020-06-05 RX ADMIN — PROPOFOL 25 MCG/KG/MIN: 10 INJECTION, EMULSION INTRAVENOUS at 07:36

## 2020-06-05 RX ADMIN — DOCUSATE SODIUM 100 MG: 100 CAPSULE, LIQUID FILLED ORAL at 20:38

## 2020-06-05 RX ADMIN — SODIUM CHLORIDE, POTASSIUM CHLORIDE, SODIUM LACTATE AND CALCIUM CHLORIDE: 600; 310; 30; 20 INJECTION, SOLUTION INTRAVENOUS at 07:35

## 2020-06-05 RX ADMIN — Medication 5 MG: at 08:06

## 2020-06-05 RX ADMIN — VECURONIUM BROMIDE 2 MG: 1 INJECTION, POWDER, LYOPHILIZED, FOR SOLUTION INTRAVENOUS at 09:00

## 2020-06-05 RX ADMIN — ONDANSETRON 4 MG: 2 INJECTION INTRAMUSCULAR; INTRAVENOUS at 11:50

## 2020-06-05 RX ADMIN — Medication 5 MG: at 07:49

## 2020-06-05 RX ADMIN — SODIUM CHLORIDE, POTASSIUM CHLORIDE, SODIUM LACTATE AND CALCIUM CHLORIDE: 600; 310; 30; 20 INJECTION, SOLUTION INTRAVENOUS at 12:19

## 2020-06-05 RX ADMIN — ACETAMINOPHEN 975 MG: 325 TABLET, FILM COATED ORAL at 17:37

## 2020-06-05 RX ADMIN — CEFAZOLIN SODIUM 1 G: 2 INJECTION, SOLUTION INTRAVENOUS at 11:32

## 2020-06-05 RX ADMIN — FENTANYL CITRATE 50 MCG: 50 INJECTION, SOLUTION INTRAMUSCULAR; INTRAVENOUS at 08:16

## 2020-06-05 RX ADMIN — HYDROMORPHONE HYDROCHLORIDE 0.5 MG: 1 INJECTION, SOLUTION INTRAMUSCULAR; INTRAVENOUS; SUBCUTANEOUS at 08:22

## 2020-06-05 RX ADMIN — GLYCOPYRROLATE 0.4 MG: 0.2 INJECTION, SOLUTION INTRAMUSCULAR; INTRAVENOUS at 12:15

## 2020-06-05 RX ADMIN — OXYBUTYNIN CHLORIDE 5 MG: 5 TABLET, EXTENDED RELEASE ORAL at 14:19

## 2020-06-05 RX ADMIN — HYDROMORPHONE HYDROCHLORIDE 0.5 MG: 1 INJECTION, SOLUTION INTRAMUSCULAR; INTRAVENOUS; SUBCUTANEOUS at 13:04

## 2020-06-05 RX ADMIN — ROCURONIUM BROMIDE 40 MG: 10 INJECTION INTRAVENOUS at 07:28

## 2020-06-05 ASSESSMENT — MIFFLIN-ST. JEOR: SCORE: 1534.18

## 2020-06-05 NOTE — DISCHARGE INSTRUCTIONS
POSTOPERATIVE INSTRUCTIONS    Diagnosis-------------------------------   Prostate cancer     Procedure-------------------------------  Procedure(s) (LRB):  ROBOTIC ASSISTED LAPAROSCOPIC RADICAL PROSTATECTOMY WITH BILATERAL PELVIC LYMPH NODE DISSECTION (Bilateral)  DAVINCI LYSIS OF ADHESIONS (N/A)      Findings--------------------------------  Significant intra-abdominal adhesions. Prostate, SVs and bilateral pelvic lymph nodes removed.     Home-going instructions-----------------         Activity Limitation:     - No driving or operating heavy machinery while on narcotic pain medication.   - No strenuous exercise for 6 weeks.   - No lifting, pushing, pulling more than 10 pounds for 6 weeks. Take care when pushing with your arms to stand up.   - Do not strain your belly area. When you bend, sit up or twice, you could strain the area around your incision.   - Do not strain with bowel movements.   - Do not drive until you can press the brake pedal quickly and fully without pain.   - Do not operate a motor vehicle while taking narcotic pain medications    FOLLOW THESE INSTRUCTIONS AS INDICATED BELOW:  - Observe operative area for signs of excessive bleeding.  - You may shower.  - Increase fluid intake to promote clear urine.  - Resume usual diet as tolerated    What to expect while recovering----------- TUBES AND DRAINS:  1) Hampton Catheter: You are going home with a Hampton catheter which will remain in place until your follow-up appointment. Your nurse or  will provide written catheter care instructions for you to take home.   - Protect the catheter and treat it like an extension of your body - keep it secured to your leg and do not let it get caught, snagged, or tugged.   - Should the catheter somehow come out of position, do not let anyone but a urologist who is aware of your recent surgery try to replace a catheter. Best yet, contact our urology office right away with any concerns.   - Apply vaseline  "twice daily to the tip of the penis/catheter insertion point to keep the area lubricated and more comfortable.    What to expect while recovering----------- WOUND CARE:  - You may shower and get incisions wet starting 48 hrs after surgery.  - Do not scrub incisions or submerge wounds (aka, bath, pool, hot tub, etc.) for 2 weeks or until wounds have healed and catheter is removed.   - Remove wound dressing 48 hours after surgery if already not removed.   - If purple dermabond glue was used, avoid applying any lotions or ointments.   - Leave incision open to air. Cover with gauze only if needed for comfort or to protect clothing from drainage.    Discharge Medications/instructions:   1) PAIN: Oxycodone is a narcotic medication that has been prescribed for pain. Narcotics will cause sleepiness and constipation, therefore it is best to stop or reduce them as soon as you can and switch to using acetaminophen (Tylenol) and/or ibuprofen (Advil/Motrin), taken as directed on the packaging. Keep in mind that certain narcotics can contain acetaminophen, also called \"APAP\" on prescription bottles. Do not take more than 4,000mg of Tylenol (acetaminophen/ APAP) from all sources in any 24 hour period since this can cause liver damage. Never drive, operate machinery or drink alcoholic beverages while you are taking narcotic pain medications.     2) CONSTIPATION: Pericolace (senna/docusate sodium) can be taken twice daily for prevention of constipation since surgery, pain medications and bladder spasm medications can all make you constipated. Please reduce or stop pericolace if you develop loose stools. Other over the counter solutions such as prune juice, miralax, fiber products, senna, and dulcolax can also be used. If you are taking the pericolace but still have not had a bowel movement in 3 days, start over-the-counter Milk of Magnesia taken twice daily until you have a nice bowel movement. Call the office with any concerns. "     3) ANTIBIOTICS - Ciprofloxacin 500mg should be taken started the day prior to your your followup appointment to remove your Hampton catheter and continued for 3 days.    4) Ditropan: Take daily to decrease catheter discomfort. Stop taking one day before your urology clinic visit      Questions/concerns------------------------  [facility]: [phone]  [facility]: [phone]    Future appointments  You are scheduled for follow up with Dr. Nolen on 6/17/20    Je Nolen MD

## 2020-06-05 NOTE — PROGRESS NOTES
PTA medications updated by Medication Scribe day before surgery via phone call with patient      -LAST DOSES ENTERED BY NURSE-    Medication history sources: Patient, Surescripts and H&P  Medication history source reliability: Good  Adherence assessment: N/A Not Observed    Significant changes made to the medication list:  None      Additional medication history information:   None        Prior to Admission medications    Medication Sig Last Dose Taking? Auth Provider   aspirin 81 MG EC tablet Take 81 mg by mouth daily   Yes Reported, Patient   Cholecalciferol (VITAMIN D) 2000 units CAPS Take 2,000 Units by mouth daily  Yes Reported, Patient   ferrous sulfate (FEROSUL) 325 (65 Fe) MG tablet Take 325 mg by mouth daily  Yes Reported, Patient   MAGNESIUM PO Take 1 tablet by mouth 2 times daily (OTC)  Yes Reported, Patient   metoprolol tartrate (LOPRESSOR) 25 MG tablet Take 12.5 mg by mouth 2 times daily   Yes Reported, Patient   Multiple Vitamin (MULTI-VITAMINS) TABS Take 1 tablet by mouth daily   Yes Reported, Patient   nitroGLYcerin (NITROSTAT) 0.4 MG sublingual tablet Place 0.4 mg under the tongue every 5 minutes as needed   at PRN Yes Reported, Patient   nystatin (MYCOSTATIN) 068864 UNIT/GM external cream Apply topically daily as needed for dry skin  at PRN Yes Reported, Patient   Omega-3 Fatty Acids (FISH OIL PO) Take 1 tablet by mouth daily  Yes Reported, Patient   POTASSIUM PO Take 1 tablet by mouth daily (OTC)  Yes Reported, Patient   tamsulosin (FLOMAX) 0.4 MG capsule Take 1 capsule (0.4 mg) by mouth daily  Patient taking differently: Take 0.4 mg by mouth every evening   at PM Yes Je Nolen MD   triamcinolone (KENALOG) 0.025 % cream Apply topically 2 times daily as needed for irritation  at PRN Yes Reported, Patient   vitamin C (ASCORBIC ACID) 100 MG tablet Take 100 mg by mouth 2 times daily   Yes Reported, Patient   coenzyme Q-10 10 MG CAPS Take 10 mg by mouth daily    Reported, Patient

## 2020-06-05 NOTE — PROVIDER NOTIFICATION
Prescriber Notification Note    The pharmacist has communicated with this patient's provider regarding a concern or therapy recommendation.    Notified Person: Tamanna  Date/Time of Notification: 6/5/20 1400  Interaction: text page  Concern/Recommendation:concerned about toradol in hx GI bleed and recurrent epistaxis     Comments/Additional Details:toradol discontinued

## 2020-06-05 NOTE — ANESTHESIA POSTPROCEDURE EVALUATION
Patient: Xu Rush    Procedure(s):  ROBOTIC ASSISTED LAPAROSCOPIC RADICAL PROSTATECTOMY WITH BILATERAL PELVIC LYMPH NODE DISSECTION  DAVINCI LYSIS OF ADHESIONS    Diagnosis:Prostate cancer (H) [C61]  Diagnosis Additional Information: No value filed.    Anesthesia Type:  General    Note:  Anesthesia Post Evaluation    Patient location during evaluation: PACU  Patient participation: Able to fully participate in evaluation  Level of consciousness: awake and alert  Pain management: adequate  Airway patency: patent  Cardiovascular status: acceptable and stable  Respiratory status: acceptable, spontaneous ventilation, unassisted and nonlabored ventilation  Hydration status: acceptable  PONV: none             Last vitals:  Vitals:    06/05/20 1320 06/05/20 1330 06/05/20 1350   BP: 122/59 125/57 138/60   Pulse: 60 58    Resp: 16 14 14   Temp:   37.6  C (99.7  F)   SpO2: 99% 99% 97%         Electronically Signed By: Fredo Aly MD  June 5, 2020  2:12 PM

## 2020-06-05 NOTE — ANESTHESIA CARE TRANSFER NOTE
Patient: Xu Rush    Procedure(s):  ROBOTIC ASSISTED LAPAROSCOPIC RADICAL PROSTATECTOMY WITH BILATERAL PELVIC LYMPH NODE DISSECTION  DAVINCI LYSIS OF ADHESIONS    Diagnosis: Prostate cancer (H) [C61]  Diagnosis Additional Information: No value filed.    Anesthesia Type:   General     Note:  Airway :Face Mask  Patient transferred to:PACU  Comments: Anesthesia Care Note    Patient: Xu Rush    Transferred to: PACU    Patient vital signs: Stable    Airway: FM    Monitors placed. VSS. PIV patent. No change in dentition. Report given to MANSOOR Christine CRNA   6/5/2020  Handoff Report: Identifed the Patient, Identified the Reponsible Provider, Reviewed the pertinent medical history, Discussed the surgical course, Reviewed Intra-OP anesthesia mangement and issues during anesthesia, Set expectations for post-procedure period and Allowed opportunity for questions and acknowledgement of understanding      Vitals: (Last set prior to Anesthesia Care Transfer)    CRNA VITALS  6/5/2020 1155 - 6/5/2020 1232      6/5/2020             Pulse:  73    SpO2:  98 %                Electronically Signed By: MAX Nicole CRNA  June 5, 2020  12:32 PM

## 2020-06-05 NOTE — OP NOTE
OPERATIVE REPORT  DATE OF SURGERY: 20  LOCATION OF SURGERY: [facility] OR  PREOPERATIVE DIAGNOSIS:  (C61) Prostate cancer (H)  POSTOPERATIVE DIAGNOSIS: (C61) Prostate cancer (H)  START TIME: 8:04 AM  END TIME: 12:00 PM  PROCEDURE PERFORMED:   1. ROBOTIC ASSISTED LAPAROSCOPIC RADICAL PROSTATECTOMY   2. ROBOTIC ASSISTED LAPAROSCOPIC BILATERAL PELVIC LYMPH NODE DISSECTION  3. LAPAROSCOPIC LYSIS OF INTRA-ABDOMINAL ADHESIONS     STAFF SURGEON: Je Nolen MD  RESIDENT SURGEON: Tristen Bird MD  ANESTHESIA: General.   ESTIMATED BLOOD LOSS: 150 mL.   DRAINS AND TUBES: 18fr vincent catheter with 15cc in the balloon, 19fr Angel drain  COMPLICATIONS: None.   DISPOSITION: PACU.   SPECIMENS OBTAINED:   ID Type Source Tests Collected by Time Destination   A : PROSTATE AND SEMINAL VESICLES  Organ Prostate SURGICAL PATHOLOGY EXAM Je Nolen MD 2020 10:43 AM    B : BILATERAL PELVIC LYMPH NODES  Tissue Lymph Node SURGICAL PATHOLOGY EXAM Je Nolen MD 2020 12:07 PM      SIGNIFICANT FINDINGS: Significant intra-abdominal adhesions. Prostate, SVs and bilateral pelvic lymph nodes removed.      HISTORY OF PRESENT ILLNESS:   69 year old man with PSA 13.1, [place] 4+3=7 prostate cancer with enlarge gland at 184cc and CT with 1.6cm lymph node vs locally advanced disease on the right base.  He is now status post 3 months of ADT with a repeat MRI that demonstrates decreased prostate size to 133 g with the irregular right base now favoring prominent vasculature rather than advanced disease. He was re-dosed with ADT on 3/26/20.     OPERATION PERFORMED:   Informed consent was obtained and the patient was brought to the operating room where general anesthesia was induced. The patient was given appropriate preoperative antibiotics and positioned supine. We then performed a timeout, verifying the correct patient's site and procedure to be performed.    Port placement was performed in the usual fashion includin mm  "supraumbilical camera port via Veress needle; Two left lateral robotic ports 8mm under direct vision; One right lateral robotic port under direct vision; 12mm right lateral assist port under direct vision. Laparoscopic lysis of adhesions in the right lower quadrant was required prior to placement of the right robotic port and assistant port.  The robot was then docked. Sigmoid adhesions were taken down from the left pelvic side wall and inguinal area. The bladder was then dissected free from the anterior abdominal wall. The endopelvic fascia was cleared using electrocautery. The superficial dorsal vein was controlled with electrocautery. The endopelvic fascia was then incised sharply on both sides of the prostate to expose the dorsal venous complex. The dorsal vein was then ligated with 0 Monocryl suture. The bladder neck was then dissected free from the prostate with care not to damage the ureteral orifices. The vas and seminal vesicles were dissected free from their posterior prostate attachments. Dissection was carried further posteriorly, exposing Denonvillier's fascia, which was incised and used to develop a plane to the prostatic apex posteriorly. The prostatic pedicles bilaterally were divided with Weck clips and scissors and non bilateral nerve sparing was performed. The dorsal vein complex was then divided with electrocautery and urethra transected sharply to completely free the prostate.       The lymph nodes were dissected in the usual fashion with electrocautery bilaterally. The boundaries of the dissection included the bifurcation of the common iliac vein to the external iliac vein posteriorly, pelvic side wall laterally and anteriorly, and the obturator nerve inferiorly. The specimen was placed in an endocatch bag.       The anastomosis was then created between the bladder neck and the urethra using 3-0 V-lock suture. This did required a \"tennis racket\" bladder neck reconstruction. This was completed " in a running fashion. A fresh 18Fr Hampton catheter was then inserted, the balloon was inflated with 15 cc water, and the bladder was filled with normal saline. The vesicourethral anastomosis was noted to be watertight.      MADDIE was placed in the left lateral pelvic gutter and brought out through the lateral port site. The assistant port was closed with a Lawson-Hyatt device. The remaining ports were removed under direct vision with no bleeding noted from the abdominal wall, and the abdomen was desufflated. The midline camera port site was extended and the specimen was extracted. The fascia was then closed with 0 PDS. All port sites were irrigated with normal saline. 30 cc marcaine was injected for incisional analgesia. The skin was closed with 4-0 monocyl in a running subcuticular fashion and skin glue was applied.      At the completion of the procedure, all surgical counts were correct.       The patient tolerated the procedure well, and he was awakened from anesthesia, extubated and transferred to the PACU in stable condition.    I was scrubbed and performed the entire procedure with assistance.    Je Nolen MD   Urology  [facility]  Clinic Phone [phone]

## 2020-06-05 NOTE — PLAN OF CARE
POD 0, arrived to  88 @1:45pm. A&Ox4, very lethargic. VSS on 2L oxygen, capno WNL. C/o mild abdominal pain, ice applied. 4 lap sites dressed w/ liquid bandage, CDI. Clear liquid diet, tolerating. Hampton patent, pink UOP w/ small clots. L MADDIE w/ bright/bloody drainage. BS hypoactive, no gas. Urology following. Discharge pending recovery.

## 2020-06-05 NOTE — PROGRESS NOTES
Nursin Observation Goals for discharge:  1. Patient able to ambulate as they were prior to admission or with assist devices provided by therapies during their stay. - Not Met yet. Pt just arrived from PACU at approx 1345 and is too groggy at this time to ambulate  2. Nurse to Notify Provider when Outpatient in a Bed discharge goals have been met and patient is ready for discharge - Not Met yet    Plan to start CL diet and ambulate in [place] when fully awake. VSS on 2L oxygen, Capno WNL. C/o mild abdominal pain, ice applied. 4 lap sites dressed w/ liquid bandage, CDI. Hampton patent, pink UOP.

## 2020-06-06 VITALS
HEART RATE: 58 BPM | BODY MASS INDEX: 24.39 KG/M2 | OXYGEN SATURATION: 97 % | SYSTOLIC BLOOD PRESSURE: 126 MMHG | TEMPERATURE: 96.6 F | RESPIRATION RATE: 16 BRPM | DIASTOLIC BLOOD PRESSURE: 58 MMHG | WEIGHT: 170.4 LBS | HEIGHT: 70 IN

## 2020-06-06 LAB
ANION GAP SERPL CALCULATED.3IONS-SCNC: 3 MMOL/L (ref 3–14)
BUN SERPL-MCNC: 13 MG/DL (ref 7–30)
CALCIUM SERPL-MCNC: 8.3 MG/DL (ref 8.5–10.1)
CHLORIDE SERPL-SCNC: 110 MMOL/L (ref 94–109)
CO2 SERPL-SCNC: 29 MMOL/L (ref 20–32)
CREAT FLD-MCNC: 0.8 MG/DL
CREAT SERPL-MCNC: 0.93 MG/DL (ref 0.66–1.25)
ERYTHROCYTE [DISTWIDTH] IN BLOOD BY AUTOMATED COUNT: 13.3 % (ref 10–15)
GFR SERPL CREATININE-BSD FRML MDRD: 82 ML/MIN/{1.73_M2}
GLUCOSE SERPL-MCNC: 107 MG/DL (ref 70–99)
HCT VFR BLD AUTO: 35.8 % (ref 40–53)
HGB BLD-MCNC: 11.9 G/DL (ref 13.3–17.7)
MCH RBC QN AUTO: 30.2 PG (ref 26.5–33)
MCHC RBC AUTO-ENTMCNC: 33.2 G/DL (ref 31.5–36.5)
MCV RBC AUTO: 91 FL (ref 78–100)
PLATELET # BLD AUTO: 207 10E9/L (ref 150–450)
POTASSIUM SERPL-SCNC: 4.2 MMOL/L (ref 3.4–5.3)
RBC # BLD AUTO: 3.94 10E12/L (ref 4.4–5.9)
SODIUM SERPL-SCNC: 142 MMOL/L (ref 133–144)
SPECIMEN SOURCE FLD: NORMAL
WBC # BLD AUTO: 10.7 10E9/L (ref 4–11)

## 2020-06-06 PROCEDURE — 25800030 ZZH RX IP 258 OP 636: Performed by: UROLOGY

## 2020-06-06 PROCEDURE — 85027 COMPLETE CBC AUTOMATED: CPT | Performed by: UROLOGY

## 2020-06-06 PROCEDURE — 25000128 H RX IP 250 OP 636: Performed by: UROLOGY

## 2020-06-06 PROCEDURE — 82570 ASSAY OF URINE CREATININE: CPT | Performed by: STUDENT IN AN ORGANIZED HEALTH CARE EDUCATION/TRAINING PROGRAM

## 2020-06-06 PROCEDURE — 25000132 ZZH RX MED GY IP 250 OP 250 PS 637: Performed by: UROLOGY

## 2020-06-06 PROCEDURE — 80048 BASIC METABOLIC PNL TOTAL CA: CPT | Performed by: UROLOGY

## 2020-06-06 PROCEDURE — 82947 ASSAY GLUCOSE BLOOD QUANT: CPT | Performed by: UROLOGY

## 2020-06-06 PROCEDURE — 36415 COLL VENOUS BLD VENIPUNCTURE: CPT | Performed by: UROLOGY

## 2020-06-06 PROCEDURE — 25000132 ZZH RX MED GY IP 250 OP 250 PS 637: Performed by: STUDENT IN AN ORGANIZED HEALTH CARE EDUCATION/TRAINING PROGRAM

## 2020-06-06 RX ORDER — ACETAMINOPHEN 325 MG/1
325-650 TABLET ORAL EVERY 6 HOURS
Status: DISCONTINUED | OUTPATIENT
Start: 2020-06-06 | End: 2020-06-06 | Stop reason: HOSPADM

## 2020-06-06 RX ADMIN — HEPARIN SODIUM 5000 UNITS: 5000 INJECTION, SOLUTION INTRAVENOUS; SUBCUTANEOUS at 01:20

## 2020-06-06 RX ADMIN — METOPROLOL TARTRATE 12.5 MG: 25 TABLET, FILM COATED ORAL at 09:12

## 2020-06-06 RX ADMIN — ACETAMINOPHEN 650 MG: 325 TABLET, FILM COATED ORAL at 13:33

## 2020-06-06 RX ADMIN — SODIUM CHLORIDE: 9 INJECTION, SOLUTION INTRAVENOUS at 09:10

## 2020-06-06 RX ADMIN — ACETAMINOPHEN 975 MG: 325 TABLET, FILM COATED ORAL at 06:06

## 2020-06-06 RX ADMIN — DOCUSATE SODIUM 100 MG: 100 CAPSULE, LIQUID FILLED ORAL at 09:12

## 2020-06-06 RX ADMIN — HEPARIN SODIUM 5000 UNITS: 5000 INJECTION, SOLUTION INTRAVENOUS; SUBCUTANEOUS at 09:15

## 2020-06-06 NOTE — PLAN OF CARE
A & O x 4, VSS, no c/o pain, scheduled tylenol appears to be managing pain. BS hypoactive and no passing flatus. Hampton patent w/clear yellow output. MADDIE w/bright bloody drainage, dressing is CDI. 4 lap sites to abdomen secured w/liquid bandage, CDI. Up w/ A1. Discharge pending recovery.

## 2020-06-06 NOTE — PROGRESS NOTES
"Urology  Progress Note    No acute issues overnight   Pain controlled   No nausea or vomiting   Ambulating in halls   Not yet passing flatus     Exam  /54 (BP Location: Right arm)   Pulse 58   Temp 97.2  F (36.2  C) (Oral)   Resp 16   Ht 1.778 m (5' 10\")   Wt 77.3 kg (170 lb 6.4 oz)   SpO2 98%   BMI 24.45 kg/m    No acute distress  Unlabored breathing  Abdomen soft, nontender, nondistended. Incisions C/D/I  MADDIE serosanguinous  Vincent with clear yellow urine in tubing    UOP 3.0L  MADDIE 155 ml     Labs  WBC 10.7  Hgb 11.9  Cr 0.93        Assessment/Plan  69 year old y/o male POD#1 s/p RALP    Neuro: Continue current regimen for pain control  CV: No acute issues, continue home medications   Pulm: incentive spirometry while awake  FEN/GI: ADAT diet, MIVF @ 50/hr  Endo: No acute issues   : Continue vincent catheter upon discharge, continue to monitor urine output   Heme/ID:  No acute issues  Activity: up Ad nikita   PPx: SCDs    Seen and examined with Dr. Santos Will discuss with Dr. Nolen.    Jackie Sotelo MD, PGY-5  Urology Resident     Contacting the Urology Team     Please use the following job codes to reach the Urology Team. Note that you must use an in house phone and that job codes cannot receive text pages.     On weekdays, dial [phone] (or star-star-star [phone] on the new [employer] telephones) then [phone] to reach the Adult Urology resident or PA on call    On weekdays, dial [phone] (or star-star-star 777 on the new [employer] telephones) then [phone] to reach the Pediatric Urology resident    On weeknights and weekends, dial [phone] (or star-star-star 777 on the new [employer] telephones) then [phone] to reach the Urology resident on call (for both Adult and Pediatrics)              "

## 2020-06-06 NOTE — PLAN OF CARE
7P-11P: POD 0 prostatectomy. VSS, weaned to RA, capno discontinued per pt request. Hampton patent with straw colored urine. Lap sites with liquid bandage, CDI. L MADDIE with serous drainage, dressing CDI. BS hypoactive, not passing flatus yet. Clear liquid diet, tolerating well. IS at bedside, encouraged. Ambulated hallways x1 with SBA. Possible discharge home tomorrow with Hampton- Hampton packet given, will need continued teaching in AM.

## 2020-06-06 NOTE — PROGRESS NOTES
0800 Nursing  Outpatient in a Bed discharge goals:  1.  Patient able to ambulate as they were prior to admission or with assist devices provided by therapies during their stay - MET - pt gets up with SBA, denies vertigo, has steady gait    Tolerated CL diet yesterday afternoon/ben. Low fiber diet started this am. Ate cream of wheat. Juice and tea and tolerated it well.  BS hypo. Not passing flatus yet. Hampton draining clear pale urine, MADDIE has minimum output (only 15 ml from previous noc shift). Postop Discomfort controlled well on the XS scheduled Tylenol alone. Pt asking for Tylenol dose to be decreased. CAPN and continuous pulse oximetry dc'd. Remains on IV fluids NS at 100cc/hr.

## 2020-06-07 NOTE — PLAN OF CARE
Patient discharged at approx 1530.   Both perioh IV's and JY were discontinued/removed by resource nurse Fermin. Denied pain at time of discharge.Pt refused to take home the discharge pharmacy med Oxycodone. Writer spoke with ben pharmacist about what actyion to take with the Oxycodone bottle since discharge pharmacy was closed. Writer instructed by pharmacist to place med bottle of oxy it in it's original white envelope from discharge pharmacy and secure it in the locked med drawer at nurse station. Writer did as instructed - which was witnessed by charge nurse Fior RODRIGUEZ RN., Plan is to return the med back to discharge pharmacy when it opens in am. HUC and charge nurse aware of this plan  All belongings returned to patient.  Discharge instructions and medications reviewed with patient.  Patient verbalized understanding and all questions were answered.  Filled RX meds  of Cipro, Colace and Ditropan  given to patient to take home.  At time of discharge, patient condition was stable and left the unit ambulatory escorted by transport aid

## 2020-06-09 ENCOUNTER — TELEPHONE (OUTPATIENT)
Dept: UROLOGY | Facility: CLINIC | Age: 70
End: 2020-06-09

## 2020-06-09 LAB — COPATH REPORT: NORMAL

## 2020-06-09 NOTE — TELEPHONE ENCOUNTER
Patient called nurse line and reports that he has taken a stool softener two to three times a day. He is having constipation post prostatectomy. Called patient who reports he is not currently taking any pain medication to cause constipation. He does report that he had a bowel movement ten minutes ago. Informed patient to reach out to primary care MD if this continues. Patient expressed understanding.     Milly Flores LPN

## 2020-06-17 ENCOUNTER — OFFICE VISIT (OUTPATIENT)
Dept: UROLOGY | Facility: CLINIC | Age: 70
End: 2020-06-17
Payer: COMMERCIAL

## 2020-06-17 VITALS
BODY MASS INDEX: 24.34 KG/M2 | HEART RATE: 74 BPM | SYSTOLIC BLOOD PRESSURE: 140 MMHG | WEIGHT: 170 LBS | HEIGHT: 70 IN | OXYGEN SATURATION: 98 % | DIASTOLIC BLOOD PRESSURE: 60 MMHG

## 2020-06-17 DIAGNOSIS — C61 MALIGNANT NEOPLASM OF PROSTATE (H): Primary | ICD-10-CM

## 2020-06-17 PROCEDURE — 99024 POSTOP FOLLOW-UP VISIT: CPT | Performed by: UROLOGY

## 2020-06-17 ASSESSMENT — MIFFLIN-ST. JEOR: SCORE: 1532.36

## 2020-06-17 ASSESSMENT — PAIN SCALES - GENERAL: PAINLEVEL: NO PAIN (0)

## 2020-06-17 NOTE — PROGRESS NOTES
"CHANDAN  CHIEF COMPLAINT   It was my pleasure to see Xu Rush who is a 69 year old male for follow-up of prostate cancer.      HPI   Xu Rush is a very pleasant 69 year old male who presents with a history of Prostate Cancer.       Received Lupron pre-operatively for >6 months to shink his prostate - initially 184cc  Initial PSA: 13.1  Biopsy [place] Score: 4 + 3 = 7  Risk Group: Intermediate  Status post RALP/BPLND on 6/5/20  Age at time of surgery: 69  Pathologic Celio Score: Unable to determine given hormone treatment  Positive Margins: None  Extra Capsular Extension: None  SV Involvement: None  Pathologic Stage: pT2  Number of Lymph Nodes Removed: 9  Number of Positive Lymph Nodes: 0    TODAY:  He returns today for a postoperative check and Hampton catheter removal  He is doing well after surgery and progressing as expected  He reports minimal incisional pain and mild discomfort with catheter      PHYSICAL EXAM  Patient is a 69 year old  male   Vitals: Blood pressure (!) 140/60, pulse 74, height 1.778 m (5' 10\"), weight 77.1 kg (170 lb), SpO2 98 %.  Body mass index is 24.39 kg/m .  General Appearance Adult:   Alert, no acute distress, oriented  HENT: throat/mouth:normal, good dentition  Lungs: no respiratory distress, or pursed lip breathing  Heart: No obvious jugular venous distension present  Abdomen: soft, nontender, no organomegaly or masses  Incisions: clean, dry and intact with skin glue in place, mild bruising around the left medial port site  Musculoskeltal: extremities normal, no peripheral edema  Skin: no suspicious lesions or rashes  Neuro: Alert, oriented, speech and mentation normal  Psych: affect and mood normal  Gait: Normal    PATHOLOGY:  FINAL DIAGNOSIS:   A: Prostate, bilateral seminal vesicles: Robotic-assisted radical   prostatectomy:   - Focal residual prostatic acinar adenocarcinoma with extensive treatment   effect, involving 1% of total   prostatic volume   - Tumor is confined " to the prostate   - Resection margins negative for carcinoma   - Benign bilateral seminal vesicles   - See comment and synoptic report for details   - 164.1 g    B: Lymph node, bilateral pelvic: Dissection:   - Nine lymph nodes, negative for metastatic carcinoma (0/9)     IMAGING:  All pertinent imaging reviewed:    All imaging studies reviewed by me.  I personally reviewed these imaging films.  A formal report from radiology will follow.    FINDINGS:  Size: 7.4 x 6 x 5.8 cm 133 grams  Hemorrhage: Mild  Peripheral zone: Heterogeneous on T2-weighted images. Regions of  mildly decreased signal on ADC or DWI.  Transition zone: Enlarged with BPH changes. Transition zone nodules  which are circumscribed or mostly encapsulated without diffusion  restriction.       In this patient with biopsy-proven [place] 3+4= 7 prostate cancer from  the right prostate base, and history of 3 months of ADT, PIRADS  scoring can not be applied due to previous treatment. No suspicious  lesion in the prostate with abnormal T2 hypointensity or restricted  diffusion to indicate a gross high grade malignancy. The transitional  zone is markedly enlarged and there is only very thin peripheral zone  which is poorly delineated. Corresponding to the asymmetric appearance  in the right posterior lateral seen on CT from 10/30/2019, there is  asymmetric prominent vasculature (series 5 image 19).      Neurovascular bundles: No neurovascular bundle involvement by  malignancy.  Seminal vesicles: The right seminal vesicle is involved by malignancy.  Lymph nodes: Suspicious lymphadenopathy as follows: 1 cm left internal  iliac chain lymph node (series 8 image 9). This is better seen CT from  10/30/2019.  Bones: No suspicious lesions  Other pelvic organs: No additional findings.                                                          IMPRESSION:In this patient with biopsy-proven [place] 3+4= 7 prostate  cancer from the right prostate base status post 3 month  of ADT;     1. Due to the prior treatment, PIRADS scoring can not be applied. No  suspicious lesion which shows restricted diffusion, abnormal masslike  T2 hyperintensity or enhancement to indicate a high-grade malignancy.  2. Markedly enlarged prostate.  3. Previously described asymmetric appearance right posterior lateral  to the prostate is favored to represent prominent vasculature right  posterior lateral to the prostate. 4. Left internal iliac round shaped  1 cm lymph node which is indeterminant but may represent metastatic  lymphadenopathy.    ASSESSMENT and PLAN  69-year-old man status post RALP/BPLND on 6-5-20 with pT2 disease, negative margins and negative lymph nodes.  164.1 g prostate    -Hampton catheter removal today  -We discussed the expected recovery course and that all patients will leak urine initially with gradual improvement over the next 3 to 6 months  -We discussed the benefits of physical therapy and an order for physical therapy consultation was placed  -We reviewed his surgical pathology  -Follow-up in 3 months with a PSA prior, this could be a virtual visit      I spent over 15 minutes with the patient.  Over half this time was spent on counseling regarding the above.    Je Nolen MD   Urology  [facility]  [facility] Phone: [phone]  [facility] Phone: [phone]

## 2020-06-17 NOTE — LETTER
"6/17/2020       RE: Xu Rush  [address]  [place] [address]     Dear Colleague,    Thank you for referring your patient, Xu Rush, to the [facility] at [facility]. Please see a copy of my visit note below.    Briefly, I saw Tico back today for postoperative check and Hampton catheter removal after his robotic prostatectomy on June 5, 2020.  His case was challenging due to his significantly enlarged prostate, however he recovered well and is progressing as expected.  We will plan to see him back in 3 months with a PSA prior.    Please let me know if you have any questions or concerns.  Thanks,   Je Nolen M.D.  Cell: [phone]     [facility]  [facility]IEF [facility]PLA[facility]   It was my pleasure to see Xu Rush who is a 69 year old male for follow-up of prostate cancer.      HPI   Xu Rush is a very pleasant 69 year old male who presents with a history of Prostate Cancer.       Received Lupron pre-operatively for >6 months to shink his prostate - initially 184cc  Initial PSA: 13.1  Biopsy Celio Score: 4 + 3 = 7  Risk Group: Intermediate  Status post RALP/BPLND on 6/5/20  Age at time of surgery: 69  Pathologic Celio Score: Unable to determine given hormone treatment  Positive Margins: None  Extra Capsular Extension: None  SV Involvement: None  Pathologic Stage: pT2  Number of Lymph Nodes Removed: 9  Number of Positive Lymph Nodes: 0    TODAY:  He returns today for a postoperative check and Hampton catheter removal  He is doing well after surgery and progressing as expected  He reports minimal incisional pain and mild discomfort with catheter      PHYSICAL EXAM  Patient is a 69 year old  male   Vitals: Blood pressure (!) 140/60, pulse 74, height 1.778 m (5' 10\"), weight 77.1 kg (170 lb), SpO2 98 %.  Body mass index is 24.39 kg/m .  General Appearance Adult:   Alert, no acute distress, oriented  HENT: " throat/mouth:normal, good dentition  Lungs: no respiratory distress, or pursed lip breathing  Heart: No obvious jugular venous distension present  Abdomen: soft, nontender, no organomegaly or masses  Incisions: clean, dry and intact with skin glue in place, mild bruising around the left medial port site  Musculoskeltal: extremities normal, no peripheral edema  Skin: no suspicious lesions or rashes  Neuro: Alert, oriented, speech and mentation normal  Psych: affect and mood normal  Gait: Normal    PATHOLOGY:  FINAL DIAGNOSIS:   A: Prostate, bilateral seminal vesicles: Robotic-assisted radical   prostatectomy:   - Focal residual prostatic acinar adenocarcinoma with extensive treatment   effect, involving 1% of total   prostatic volume   - Tumor is confined to the prostate   - Resection margins negative for carcinoma   - Benign bilateral seminal vesicles   - See comment and synoptic report for details   - 164.1 g    B: Lymph node, bilateral pelvic: Dissection:   - Nine lymph nodes, negative for metastatic carcinoma (0/9)     IMAGING:  All pertinent imaging reviewed:    All imaging studies reviewed by me.  I personally reviewed these imaging films.  A formal report from radiology will follow.    FINDINGS:  Size: 7.4 x 6 x 5.8 cm 133 grams  Hemorrhage: Mild  Peripheral zone: Heterogeneous on T2-weighted images. Regions of  mildly decreased signal on ADC or DWI.  Transition zone: Enlarged with BPH changes. Transition zone nodules  which are circumscribed or mostly encapsulated without diffusion  restriction.       In this patient with biopsy-proven [place] 3+4= 7 prostate cancer from  the right prostate base, and history of 3 months of ADT, PIRADS  scoring can not be applied due to previous treatment. No suspicious  lesion in the prostate with abnormal T2 hypointensity or restricted  diffusion to indicate a gross high grade malignancy. The transitional  zone is markedly enlarged and there is only very thin peripheral  zone  which is poorly delineated. Corresponding to the asymmetric appearance  in the right posterior lateral seen on CT from 10/30/2019, there is  asymmetric prominent vasculature (series 5 image 19).      Neurovascular bundles: No neurovascular bundle involvement by  malignancy.  Seminal vesicles: The right seminal vesicle is involved by malignancy.  Lymph nodes: Suspicious lymphadenopathy as follows: 1 cm left internal  iliac chain lymph node (series 8 image 9). This is better seen CT from  10/30/2019.  Bones: No suspicious lesions  Other pelvic organs: No additional findings.                                                          IMPRESSION:In this patient with biopsy-proven [place] 3+4= 7 prostate  cancer from the right prostate base status post 3 month of ADT;     1. Due to the prior treatment, PIRADS scoring can not be applied. No  suspicious lesion which shows restricted diffusion, abnormal masslike  T2 hyperintensity or enhancement to indicate a high-grade malignancy.  2. Markedly enlarged prostate.  3. Previously described asymmetric appearance right posterior lateral  to the prostate is favored to represent prominent vasculature right  posterior lateral to the prostate. 4. Left internal iliac round shaped  1 cm lymph node which is indeterminant but may represent metastatic  lymphadenopathy.    ASSESSMENT and PLAN  69-year-old man status post RALP/BPLND on 6-5-20 with pT2 disease, negative margins and negative lymph nodes.  164.1 g prostate    -Hampton catheter removal today  -We discussed the expected recovery course and that all patients will leak urine initially with gradual improvement over the next 3 to 6 months  -We discussed the benefits of physical therapy and an order for physical therapy consultation was placed  -We reviewed his surgical pathology  -Follow-up in 3 months with a PSA prior, this could be a virtual visit      I spent over 15 minutes with the patient.  Over half this time was spent on  counseling regarding the above.    Je Nolen MD   Urology  [facility]  [facility] Phone: [phone]  [facility] Phone: [phone]

## 2020-06-17 NOTE — NURSING NOTE
Chief Complaint   Patient presents with     Follow Up     patient is here for PO prostatectomy        Catheter removal documentation on 6/17/2020:    Xu Rush presents to the clinic for catheter removal.  Reason for removal: scheduled removal.   Order has been verified. yes  Catheter successfully removed at 3:24 PM without immediate complication.  150 cc's of urine present in the catheter bag.  Urethral meatus is free of secretions and encrustation.  The patient tolerated removal well.       Gina Tamayo, CMA

## 2020-06-26 ENCOUNTER — THERAPY VISIT (OUTPATIENT)
Dept: PHYSICAL THERAPY | Facility: CLINIC | Age: 70
End: 2020-06-26
Attending: UROLOGY
Payer: COMMERCIAL

## 2020-06-26 DIAGNOSIS — M99.05 SOMATIC DYSFUNCTION OF PELVIS REGION: ICD-10-CM

## 2020-06-26 DIAGNOSIS — R32 URINARY INCONTINENCE: ICD-10-CM

## 2020-06-26 DIAGNOSIS — Z90.79 S/P PROSTATECTOMY: ICD-10-CM

## 2020-06-26 PROCEDURE — 97161 PT EVAL LOW COMPLEX 20 MIN: CPT | Mod: GP | Performed by: PHYSICAL THERAPIST

## 2020-06-26 PROCEDURE — 97110 THERAPEUTIC EXERCISES: CPT | Mod: GP | Performed by: PHYSICAL THERAPIST

## 2020-06-26 PROCEDURE — 97535 SELF CARE MNGMENT TRAINING: CPT | Mod: GP | Performed by: PHYSICAL THERAPIST

## 2020-06-26 NOTE — PROGRESS NOTES
[facility] Initial Evaluation  Subjective:  The history is provided by the patient. No  was used.   Patient Health History  Xu Rush being seen for prostatectomy.       Problem occurred: reports having a prostatectomy on 6/5/20 with catheter removed on 6/17/20. He c/o urinary incontinence , using 20-25 pads in daytime and 3 at night.  He is not feeling an urge to urinate however has tried with only a weak urine stream.      Pain is reported as 0/10 on pain scale.  General health as reported by patient is good.  Pertinent medical history includes: cancer, high blood pressure, history of fractures and incontinence.   Red flags:  None as reported by patient.  Medical allergies: none.   Surgeries include:  Heart surgery and other (heart stent 2011,hernia- 1975, gall bladder 1975 ).    Current medications:  High blood pressure medication.    Current occupation is self employed.                     Therapist Generated HPI Evaluation         Type of problem:  Incontinence.    This is a new condition.  Condition occurred with:  After surgery.            Symptoms are exacerbated by laughing, sneezing, coughing and other (sit to stand, bending )  and relieved by nothing.                              Objective:  System                                 Pelvic Dysfunction Evaluation:    Bladder/Pelvic Problems:    Storage Problem:  Stress incontinence and nocturia  Emptying Problem:  Hesitancy          Abdominal Wall:        Scar Mobility:  Incisions healing well,  non tender        External Assessment:    Skin Condition:  Normal      Tissue Symmetry:  Normal    Muscle Contraction/Perineal Mobility:  Elevation and urogential triangle descent    Additional History:        Caffeine Consumption:  20 oz                     General     ROS    Assessment/Plan:    Patient is a 69 year old male with pelvic complaints.    Patient has the following significant findings with corresponding  treatment plan.                Diagnosis 1:  Urinary incontinence, S/P prostatectomy  Decreased strength - therapeutic exercise, therapeutic activities and home program  Impaired muscle performance - biofeedback, neuro re-education and home program  Decreased function - therapeutic activities and home program    Therapy Evaluation Codes:   1) History comprised of:   Personal factors that impact the plan of care:      None.    Comorbidity factors that impact the plan of care are:      None.     Medications impacting care: None.  2) Examination of Body Systems comprised of:   Body structures and functions that impact the plan of care:      Pelvis.   Activity limitations that impact the plan of care are:      Stress incontinence and Urinary incontinence.  3) Clinical presentation characteristics are:   Stable/Uncomplicated.  4) Decision-Making    Low complexity using standardized patient assessment instrument and/or measureable assessment of functional outcome.  Cumulative Therapy Evaluation is: Low complexity.    Previous and current functional limitations:  (See Goal Flow Sheet for this information)    Short term and Long term goals: (See Goal Flow Sheet for this information)     Communication ability:  Patient appears to be able to clearly communicate and understand verbal and written communication and follow directions correctly.  Treatment Explanation - The following has been discussed with the patient:   RX ordered/plan of care  Anticipated outcomes  Possible risks and side effects  This patient would benefit from PT intervention to resume normal activities.   Rehab potential is good.    Frequency:  2 X a month, once daily  Duration:  for 3 months  Discharge Plan:  Achieve all LTG.  Independent in home treatment program.  Reach maximal therapeutic benefit.    Please refer to the daily flowsheet for treatment today, total treatment time and time spent performing 1:1 timed codes.

## 2020-07-08 ENCOUNTER — VIRTUAL VISIT (OUTPATIENT)
Dept: UROLOGY | Facility: CLINIC | Age: 70
End: 2020-07-08
Payer: COMMERCIAL

## 2020-07-08 VITALS — HEIGHT: 70 IN | BODY MASS INDEX: 24.34 KG/M2 | WEIGHT: 170 LBS

## 2020-07-08 DIAGNOSIS — T81.89XA LYMPHOCELE AFTER SURGICAL PROCEDURE: ICD-10-CM

## 2020-07-08 DIAGNOSIS — C61 MALIGNANT NEOPLASM OF PROSTATE (H): Primary | ICD-10-CM

## 2020-07-08 DIAGNOSIS — I89.8 LYMPHOCELE AFTER SURGICAL PROCEDURE: ICD-10-CM

## 2020-07-08 PROCEDURE — 99024 POSTOP FOLLOW-UP VISIT: CPT | Performed by: UROLOGY

## 2020-07-08 ASSESSMENT — MIFFLIN-ST. JEOR: SCORE: 1532.36

## 2020-07-08 ASSESSMENT — PAIN SCALES - GENERAL: PAINLEVEL: MILD PAIN (2)

## 2020-07-08 NOTE — LETTER
"7/8/2020       RE: Xu Rush  [address]  [place] [address]     Dear Colleague,    Thank you for referring your patient, Xu Rush, to the [facility] at [facility]. Please see a copy of my visit note below.    Xu Rush is a 69 year old male who is being evaluated via a billable video visit.      The patient has been notified of following:     \"This video visit will be conducted via a call between you and your physician/provider. We have found that certain health care needs can be provided without the need for an in-person physical exam.  This service lets us provide the care you need with a video conversation.  If a prescription is necessary we can send it directly to your pharmacy.  If lab work is needed we can place an order for that and you can then stop by our lab to have the test done at a later time.    Video visits are billed at different rates depending on your insurance coverage.  Please reach out to your insurance provider with any questions.    If during the course of the call the physician/provider feels a video visit is not appropriate, you will not be charged for this service.\"    Patient has given verbal consent for Video visit? Yes  How would you like to obtain your AVS? MyChart  Patient would like the video invitation sent by: Text to cell phone: [phone]  Will anyone else be joining your video visit? No    [place]  CHIEF COMPLAINT   It was my pleasure to see Xu Rush who is a 69 year old male for follow-up of prostate cancer.      HPI   Xu Rush is a very pleasant 69 year old male who presents with a history of Prostate Cancer.       Received Lupron pre-operatively for >6 months to shink his prostate - initially 184cc  Initial PSA: 13.1  Biopsy [place] Score: 4 + 3 = 7  Risk Group: Intermediate  Status post RALP/BPLND on 6/5/20  Age at time of surgery: 69  Pathologic Celio Score: " "Unable to determine given hormone treatment  Positive Margins: None  Extra Capsular Extension: None  SV Involvement: None  Pathologic Stage: pT2  Number of Lymph Nodes Removed: 9  Number of Positive Lymph Nodes: 0    TODAY:  Presented to [facility] on 6/22/20 with some left side flank pain    CT with 15x4 left sided fluid collection consistent with lymphocele and less likely hematoma. Pain has gradually been improving     Urine control is gradually getting better      PHYSICAL EXAM  Patient is a 69 year old  male   Vitals: Height 1.778 m (5' 10\"), weight 77.1 kg (170 lb).  Body mass index is 24.39 kg/m .  General Appearance Adult:   Alert, no acute distress, oriented  HENT: throat/mouth:normal, good dentition  Lungs: no respiratory distress, or pursed lip breathing  Heart: No obvious jugular venous distension present  Abdomen: non - distended  Musculoskeltal: extremities normal, no peripheral edema  Skin: no suspicious lesions or rashes  Neuro: Alert, oriented, speech and mentation normal  Psych: affect and mood normal  Gait: Normal     PATHOLOGY:  FINAL DIAGNOSIS:   A: Prostate, bilateral seminal vesicles: Robotic-assisted radical   prostatectomy:   - Focal residual prostatic acinar adenocarcinoma with extensive treatment   effect, involving 1% of total   prostatic volume   - Tumor is confined to the prostate   - Resection margins negative for carcinoma   - Benign bilateral seminal vesicles   - See comment and synoptic report for details   - 164.1 g    B: Lymph node, bilateral pelvic: Dissection:   - Nine lymph nodes, negative for metastatic carcinoma (0/9)     IMAGING:  All pertinent imaging reviewed:    All imaging studies reviewed by me.  I personally reviewed these imaging films.  A formal report from radiology will follow.    FINDINGS:  Size: 7.4 x 6 x 5.8 cm 133 grams  Hemorrhage: Mild  Peripheral zone: Heterogeneous on T2-weighted images. Regions of  mildly decreased signal on ADC or DWI.  Transition zone: " Enlarged with BPH changes. Transition zone nodules  which are circumscribed or mostly encapsulated without diffusion  restriction.       In this patient with biopsy-proven [place] 3+4= 7 prostate cancer from  the right prostate base, and history of 3 months of ADT, PIRADS  scoring can not be applied due to previous treatment. No suspicious  lesion in the prostate with abnormal T2 hypointensity or restricted  diffusion to indicate a gross high grade malignancy. The transitional  zone is markedly enlarged and there is only very thin peripheral zone  which is poorly delineated. Corresponding to the asymmetric appearance  in the right posterior lateral seen on CT from 10/30/2019, there is  asymmetric prominent vasculature (series 5 image 19).      Neurovascular bundles: No neurovascular bundle involvement by  malignancy.  Seminal vesicles: The right seminal vesicle is involved by malignancy.  Lymph nodes: Suspicious lymphadenopathy as follows: 1 cm left internal  iliac chain lymph node (series 8 image 9). This is better seen CT from  10/30/2019.  Bones: No suspicious lesions  Other pelvic organs: No additional findings.                                                          IMPRESSION:In this patient with biopsy-proven [place] 3+4= 7 prostate  cancer from the right prostate base status post 3 month of ADT;     1. Due to the prior treatment, PIRADS scoring can not be applied. No  suspicious lesion which shows restricted diffusion, abnormal masslike  T2 hyperintensity or enhancement to indicate a high-grade malignancy.  2. Markedly enlarged prostate.  3. Previously described asymmetric appearance right posterior lateral  to the prostate is favored to represent prominent vasculature right  posterior lateral to the prostate. 4. Left internal iliac round shaped  1 cm lymph node which is indeterminant but may represent metastatic  lymphadenopathy.    ASSESSMENT and PLAN  69-year-old man status post RALP/BPLND on 6-5-20 with  pT2 disease, negative margins and negative lymph nodes.  164.1 g prostate    -Doing well and we discussed that the body will take time to resolve the lymphocele  - Pain doing well  - Plan to change next visit to video with PSA drawn locally       I spent over 9 minutes with the patient.  Over half this time was spent on counseling regarding the above.    Je Nolen MD   Urology  [facility]  [facility] Phone: [phone]  [facility] Phone: [phone]      Video-Visit Details    Type of service:  Video Visit    Video Start Time: 3:45 PM  Video End Time: 3:54 PM    Originating Location (pt. Location): Home    Distant Location (provider location):  [facility]     Platform used for Video Visit: [employer]    Je Nolen MD

## 2020-07-10 ENCOUNTER — THERAPY VISIT (OUTPATIENT)
Dept: PHYSICAL THERAPY | Facility: CLINIC | Age: 70
End: 2020-07-10
Payer: COMMERCIAL

## 2020-07-10 DIAGNOSIS — Z90.79 S/P PROSTATECTOMY: ICD-10-CM

## 2020-07-10 DIAGNOSIS — R32 URINARY INCONTINENCE: ICD-10-CM

## 2020-07-10 DIAGNOSIS — M99.05 SOMATIC DYSFUNCTION OF PELVIS REGION: ICD-10-CM

## 2020-07-10 PROCEDURE — 97530 THERAPEUTIC ACTIVITIES: CPT | Mod: GP | Performed by: PHYSICAL THERAPIST

## 2020-07-10 PROCEDURE — 97110 THERAPEUTIC EXERCISES: CPT | Mod: GP | Performed by: PHYSICAL THERAPIST

## 2020-07-24 ENCOUNTER — THERAPY VISIT (OUTPATIENT)
Dept: PHYSICAL THERAPY | Facility: CLINIC | Age: 70
End: 2020-07-24
Payer: COMMERCIAL

## 2020-07-24 DIAGNOSIS — M99.05 SOMATIC DYSFUNCTION OF PELVIS REGION: ICD-10-CM

## 2020-07-24 DIAGNOSIS — R32 URINARY INCONTINENCE: ICD-10-CM

## 2020-07-24 DIAGNOSIS — Z90.79 S/P PROSTATECTOMY: ICD-10-CM

## 2020-07-24 PROCEDURE — 97110 THERAPEUTIC EXERCISES: CPT | Mod: GP | Performed by: PHYSICAL THERAPIST

## 2020-07-24 PROCEDURE — 97530 THERAPEUTIC ACTIVITIES: CPT | Mod: GP | Performed by: PHYSICAL THERAPIST

## 2020-08-12 ENCOUNTER — THERAPY VISIT (OUTPATIENT)
Dept: PHYSICAL THERAPY | Facility: CLINIC | Age: 70
End: 2020-08-12
Payer: COMMERCIAL

## 2020-08-12 DIAGNOSIS — R32 URINARY INCONTINENCE: ICD-10-CM

## 2020-08-12 DIAGNOSIS — Z90.79 S/P PROSTATECTOMY: ICD-10-CM

## 2020-08-12 DIAGNOSIS — M99.05 SOMATIC DYSFUNCTION OF PELVIS REGION: ICD-10-CM

## 2020-08-12 PROCEDURE — 97110 THERAPEUTIC EXERCISES: CPT | Mod: GP | Performed by: PHYSICAL THERAPIST

## 2020-08-12 PROCEDURE — 97530 THERAPEUTIC ACTIVITIES: CPT | Mod: GP | Performed by: PHYSICAL THERAPIST

## 2020-09-16 ENCOUNTER — THERAPY VISIT (OUTPATIENT)
Dept: PHYSICAL THERAPY | Facility: CLINIC | Age: 70
End: 2020-09-16
Payer: COMMERCIAL

## 2020-09-16 DIAGNOSIS — Z90.79 S/P PROSTATECTOMY: ICD-10-CM

## 2020-09-16 DIAGNOSIS — M99.05 SOMATIC DYSFUNCTION OF PELVIS REGION: ICD-10-CM

## 2020-09-16 DIAGNOSIS — R32 URINARY INCONTINENCE: ICD-10-CM

## 2020-09-16 PROCEDURE — 97530 THERAPEUTIC ACTIVITIES: CPT | Mod: GP | Performed by: PHYSICAL THERAPIST

## 2020-09-16 PROCEDURE — 97110 THERAPEUTIC EXERCISES: CPT | Mod: GP | Performed by: PHYSICAL THERAPIST

## 2020-09-16 NOTE — PROGRESS NOTES
"Subjective:  HPI  Physical Exam                    Objective:  System    Physical Exam    General     ROS    Assessment/Plan:    DISCHARGE REPORT    Progress reporting period is from 6/26/20 to 9/16/20.       SUBJECTIVE  Subjective changes noted by patient:    Subjective: I'm not using pads now in the daytime or at night. I leak/ dribble urine about 1 time daily when more active, usually small amounts. Several times I did wet my pants. Min to no leakage with a cough, sneeze, bending or lifting. I can start my urine stream easily and have a steady stream. I wake up to urinate about 3 times per night and void every 2 hrs in daytime. I'm doing the kegels about 1-2 times a day. I'm not constipated now, passing a BM easily and emptying completely.       Current pain level is NA  .     Previous pain level was  NA  .   Changes in function:  Yes (See Goal flowsheet attached for changes in current functional level)  Adverse reaction to treatment or activity: None    OBJECTIVE  Changes noted in objective findings:  Yes,   Objective: increased pelvic floor muscle awareness, strength and endurance. Improved neutral sitting and standing posture with verbal cues for TrA activation, also with bending and lifting.  Review HEP with emphasis to 3 times daily kegels( quick flicks or 10\" holds) in sitting and standing position. Good progression of advanced pelvic floor strengthening exercises. Incisions well healed and non- tender.       ASSESSMENT/PLAN  Updated problem list and treatment plan: Diagnosis 1:  S/P prostatectomy,urinary incontinence  Impaired muscle performance - neuro re-education and home program  Decreased function - therapeutic activities and home program  STG/LTGs have been met or progress has been made towards goals:  Yes (See Goal flow sheet completed today.)  Assessment of Progress: The patient's condition is improving.  The patient's condition has potential to improve.  Patient is meeting short term goals and is " progressing towards long term goals.  Self Management Plans:  Patient is independent in a home treatment program.  Patient is independent in self management of symptoms.  I have re-evaluated this patient and find that the nature, scope, duration and intensity of the therapy is appropriate for the medical condition of the patient.  Xu continues to require the following intervention to meet STG and LTG's:  PT    Recommendations:  This patient is ready to be discharged from therapy and continue their home treatment program.    Please refer to the daily flowsheet for treatment today, total treatment time and time spent performing 1:1 timed codes.

## 2020-09-22 ENCOUNTER — TRANSFERRED RECORDS (OUTPATIENT)
Dept: HEALTH INFORMATION MANAGEMENT | Facility: CLINIC | Age: 70
End: 2020-09-22

## 2020-09-22 DIAGNOSIS — C61 MALIGNANT NEOPLASM OF PROSTATE (H): Primary | ICD-10-CM

## 2020-09-23 ENCOUNTER — OFFICE VISIT (OUTPATIENT)
Dept: UROLOGY | Facility: CLINIC | Age: 70
End: 2020-09-23
Payer: COMMERCIAL

## 2020-09-23 VITALS
DIASTOLIC BLOOD PRESSURE: 76 MMHG | SYSTOLIC BLOOD PRESSURE: 120 MMHG | BODY MASS INDEX: 24.62 KG/M2 | WEIGHT: 172 LBS | HEIGHT: 70 IN | HEART RATE: 52 BPM

## 2020-09-23 DIAGNOSIS — C61 MALIGNANT NEOPLASM OF PROSTATE (H): Primary | ICD-10-CM

## 2020-09-23 PROCEDURE — 99213 OFFICE O/P EST LOW 20 MIN: CPT | Performed by: UROLOGY

## 2020-09-23 ASSESSMENT — MIFFLIN-ST. JEOR: SCORE: 1541.44

## 2020-09-23 ASSESSMENT — PAIN SCALES - GENERAL: PAINLEVEL: NO PAIN (0)

## 2020-09-23 NOTE — LETTER
"9/23/2020       RE: Xu Rush  [address] [facility] [id number]     Dear Colleague,    Thank you for referring your patient, Xu Rush, to the [facility] [place] at [facility]. Please see a copy of my visit note below.    CHANDAN  CHIEF COMPLAINT   It was my pleasure to see Xu Rush who is a 69 year old male for follow-up of prostate cancer.      HPI   Xu Rush is a very pleasant 69 year old male who presents with a history of Prostate Cancer.       Received Lupron pre-operatively for >6 months to shink his prostate - initially 184cc  Initial PSA: 13.1  Biopsy Celio Score: 4 + 3 = 7  Risk Group: Intermediate  Status post RALP/BPLND on 6/5/20  Age at time of surgery: 69  Pathologic Homer Score: Unable to determine given hormone treatment  Positive Margins: None  Extra Capsular Extension: None  SV Involvement: None  Pathologic Stage: pT2  Number of Lymph Nodes Removed: 9  Number of Positive Lymph Nodes: 0    TODAY:  Presented to [facility] ER on 6/22/20 with some left side flank pain    CT with 15x4 left sided fluid collection consistent with lymphocele and less likely hematoma. Pain has gradually been improving     Urine control is gradually getting better    TODAY:  He returns for scheduled follow up  Urine control is getting better - off pads  Does have some occasional dribbling once daily  No attempts at erections, had baseline Erectile dysfunction      PHYSICAL EXAM  Patient is a 69 year old  male   Vitals: Blood pressure 120/76, pulse 52, height 1.778 m (5' 10\"), weight 78 kg (172 lb).  Body mass index is 24.68 kg/m .  General Appearance Adult:   Alert, no acute distress, oriented  HENT: throat/mouth:normal, good dentition  Lungs: no respiratory distress, or pursed lip breathing  Heart: No obvious jugular venous distension present  Abdomen: non - distended  Incisions: well healed  Musculoskeltal: extremities " normal, no peripheral edema  Skin: no suspicious lesions or rashes  Neuro: Alert, oriented, speech and mentation normal  Psych: affect and mood normal  Gait: Normal     PSA:  9/22/20 0.0    PATHOLOGY:  FINAL DIAGNOSIS:   A: Prostate, bilateral seminal vesicles: Robotic-assisted radical   prostatectomy:   - Focal residual prostatic acinar adenocarcinoma with extensive treatment   effect, involving 1% of total   prostatic volume   - Tumor is confined to the prostate   - Resection margins negative for carcinoma   - Benign bilateral seminal vesicles   - See comment and synoptic report for details   - 164.1 g    B: Lymph node, bilateral pelvic: Dissection:   - Nine lymph nodes, negative for metastatic carcinoma (0/9)     IMAGING:  All pertinent imaging reviewed:    All imaging studies reviewed by me.  I personally reviewed these imaging films.  A formal report from radiology will follow.    FINDINGS:  Size: 7.4 x 6 x 5.8 cm 133 grams  Hemorrhage: Mild  Peripheral zone: Heterogeneous on T2-weighted images. Regions of  mildly decreased signal on ADC or DWI.  Transition zone: Enlarged with BPH changes. Transition zone nodules  which are circumscribed or mostly encapsulated without diffusion  restriction.       In this patient with biopsy-proven [place] 3+4= 7 prostate cancer from  the right prostate base, and history of 3 months of ADT, PIRADS  scoring can not be applied due to previous treatment. No suspicious  lesion in the prostate with abnormal T2 hypointensity or restricted  diffusion to indicate a gross high grade malignancy. The transitional  zone is markedly enlarged and there is only very thin peripheral zone  which is poorly delineated. Corresponding to the asymmetric appearance  in the right posterior lateral seen on CT from 10/30/2019, there is  asymmetric prominent vasculature (series 5 image 19).      Neurovascular bundles: No neurovascular bundle involvement by  malignancy.  Seminal vesicles: The right  seminal vesicle is involved by malignancy.  Lymph nodes: Suspicious lymphadenopathy as follows: 1 cm left internal  iliac chain lymph node (series 8 image 9). This is better seen CT from  10/30/2019.  Bones: No suspicious lesions  Other pelvic organs: No additional findings.                                                          IMPRESSION:In this patient with biopsy-proven [place] 3+4= 7 prostate  cancer from the right prostate base status post 3 month of ADT;     1. Due to the prior treatment, PIRADS scoring can not be applied. No  suspicious lesion which shows restricted diffusion, abnormal masslike  T2 hyperintensity or enhancement to indicate a high-grade malignancy.  2. Markedly enlarged prostate.  3. Previously described asymmetric appearance right posterior lateral  to the prostate is favored to represent prominent vasculature right  posterior lateral to the prostate. 4. Left internal iliac round shaped  1 cm lymph node which is indeterminant but may represent metastatic  lymphadenopathy.    ASSESSMENT and PLAN  69-year-old man status post RALP/BPLND on 6-5-20 with pT2 disease, negative margins and negative lymph nodes.  164.1 g prostate    - We discussed this PSA results  - F/U in 6 months with PSA prior    History of LEFT renal mass  - This had appeared as a small renal mass per patient on recent CT  - No report available  - Will plan for CT Kidney in 6 months    I spent over 15 minutes with the patient.  Over half this time was spent on counseling regarding the above.    Je Nolen MD   Urology  [facility]  [facility] Phone: [phone]  [facility] Phone: [phone]

## 2020-09-23 NOTE — PROGRESS NOTES
"CHANDAN  CHIEF COMPLAINT   It was my pleasure to see Xu Rush who is a 69 year old male for follow-up of prostate cancer.      HPI   Xu Rush is a very pleasant 69 year old male who presents with a history of Prostate Cancer.       Received Lupron pre-operatively for >6 months to shink his prostate - initially 184cc  Initial PSA: 13.1  Biopsy Sidney Score: 4 + 3 = 7  Risk Group: Intermediate  Status post RALP/BPLND on 6/5/20  Age at time of surgery: 69  Pathologic Celio Score: Unable to determine given hormone treatment  Positive Margins: None  Extra Capsular Extension: None  SV Involvement: None  Pathologic Stage: pT2  Number of Lymph Nodes Removed: 9  Number of Positive Lymph Nodes: 0    TODAY:  Presented to [facility] on 6/22/20 with some left side flank pain    CT with 15x4 left sided fluid collection consistent with lymphocele and less likely hematoma. Pain has gradually been improving     Urine control is gradually getting better    TODAY:  He returns for scheduled follow up  Urine control is getting better - off pads  Does have some occasional dribbling once daily  No attempts at erections, had baseline Erectile dysfunction      PHYSICAL EXAM  Patient is a 69 year old  male   Vitals: Blood pressure 120/76, pulse 52, height 1.778 m (5' 10\"), weight 78 kg (172 lb).  Body mass index is 24.68 kg/m .  General Appearance Adult:   Alert, no acute distress, oriented  HENT: throat/mouth:normal, good dentition  Lungs: no respiratory distress, or pursed lip breathing  Heart: No obvious jugular venous distension present  Abdomen: non - distended  Incisions: well healed  Musculoskeltal: extremities normal, no peripheral edema  Skin: no suspicious lesions or rashes  Neuro: Alert, oriented, speech and mentation normal  Psych: affect and mood normal  Gait: Normal     PSA:  9/22/20 0.0    PATHOLOGY:  FINAL DIAGNOSIS:   A: Prostate, bilateral seminal vesicles: Robotic-assisted radical   prostatectomy:   - Focal " residual prostatic acinar adenocarcinoma with extensive treatment   effect, involving 1% of total   prostatic volume   - Tumor is confined to the prostate   - Resection margins negative for carcinoma   - Benign bilateral seminal vesicles   - See comment and synoptic report for details   - 164.1 g    B: Lymph node, bilateral pelvic: Dissection:   - Nine lymph nodes, negative for metastatic carcinoma (0/9)     IMAGING:  All pertinent imaging reviewed:    All imaging studies reviewed by me.  I personally reviewed these imaging films.  A formal report from radiology will follow.    FINDINGS:  Size: 7.4 x 6 x 5.8 cm 133 grams  Hemorrhage: Mild  Peripheral zone: Heterogeneous on T2-weighted images. Regions of  mildly decreased signal on ADC or DWI.  Transition zone: Enlarged with BPH changes. Transition zone nodules  which are circumscribed or mostly encapsulated without diffusion  restriction.       In this patient with biopsy-proven [place] 3+4= 7 prostate cancer from  the right prostate base, and history of 3 months of ADT, PIRADS  scoring can not be applied due to previous treatment. No suspicious  lesion in the prostate with abnormal T2 hypointensity or restricted  diffusion to indicate a gross high grade malignancy. The transitional  zone is markedly enlarged and there is only very thin peripheral zone  which is poorly delineated. Corresponding to the asymmetric appearance  in the right posterior lateral seen on CT from 10/30/2019, there is  asymmetric prominent vasculature (series 5 image 19).      Neurovascular bundles: No neurovascular bundle involvement by  malignancy.  Seminal vesicles: The right seminal vesicle is involved by malignancy.  Lymph nodes: Suspicious lymphadenopathy as follows: 1 cm left internal  iliac chain lymph node (series 8 image 9). This is better seen CT from  10/30/2019.  Bones: No suspicious lesions  Other pelvic organs: No additional findings.                                                           IMPRESSION:In this patient with biopsy-proven [place] 3+4= 7 prostate  cancer from the right prostate base status post 3 month of ADT;     1. Due to the prior treatment, PIRADS scoring can not be applied. No  suspicious lesion which shows restricted diffusion, abnormal masslike  T2 hyperintensity or enhancement to indicate a high-grade malignancy.  2. Markedly enlarged prostate.  3. Previously described asymmetric appearance right posterior lateral  to the prostate is favored to represent prominent vasculature right  posterior lateral to the prostate. 4. Left internal iliac round shaped  1 cm lymph node which is indeterminant but may represent metastatic  lymphadenopathy.    ASSESSMENT and PLAN  69-year-old man status post RALP/BPLND on 6-5-20 with pT2 disease, negative margins and negative lymph nodes.  164.1 g prostate    - We discussed this PSA results  - F/U in 6 months with PSA prior    History of LEFT renal mass  - This had appeared as a small renal mass per patient on recent CT  - No report available  - Will plan for CT Kidney in 6 months    I spent over 15 minutes with the patient.  Over half this time was spent on counseling regarding the above.    Je Nolen MD   Urology  [facility]  [facility] Phone: [phone]  [facility] Phone: [phone]

## 2021-01-03 ENCOUNTER — HEALTH MAINTENANCE LETTER (OUTPATIENT)
Age: 71
End: 2021-01-03

## 2021-01-26 ENCOUNTER — TELEPHONE (OUTPATIENT)
Dept: UROLOGY | Facility: CLINIC | Age: 71
End: 2021-01-26

## 2021-01-26 NOTE — TELEPHONE ENCOUNTER
----- Message from Cheryl Tobar RN sent at 1/26/2021  7:58 AM CST -----  Regarding: FW: Schedule  Hello,    Are you able to assist with scheduling patient with Dr. Nolen in March with PSA lab and CT scan completed prior?    Thank you,    Cheryl Tobar RN, BSN    ----- Message -----  From: Makeda Pisano LPN  Sent: 12/22/2020  To: [facility] [place]  Subject: Schedule                                         This patient would like to follow up in [place]. Can we get him scheduled for 6 month follow up with PSA and CT Kidney prior. -Please schedule for 03/2021

## 2021-01-26 NOTE — TELEPHONE ENCOUNTER
Called patient and spoke to regarding scheduling his urology appointments in March. Patient is declining to schedule at this time. Patient states he would like a call back in 2 months to schedule these appointments sometime in the spring.    Will postpone for 2 months per patient's request.    Routing to [facility] [place] as a FYI.    Call center OK to schedule PSA lab, CT scan, and follow up with Dr Nolen if patient were to call back to schedule.      Nikki Garcia  Surgical Specialties Procedure   [facility] [place]  1/26/2021 12:38 PM

## 2021-03-15 NOTE — TELEPHONE ENCOUNTER
Patient scheduled his CT scan and PSA lab, however, he was wondering if a cholesterol check order could be added to his lab appointment.

## 2021-03-15 NOTE — TELEPHONE ENCOUNTER
Left generic voicemail for patient to return call to clinic. When call is returned will inform patient his PCP can order cholesterol labs.    Makeda Pisano LPN

## 2021-03-23 ENCOUNTER — ANCILLARY PROCEDURE (OUTPATIENT)
Dept: CT IMAGING | Facility: CLINIC | Age: 71
End: 2021-03-23
Attending: UROLOGY
Payer: COMMERCIAL

## 2021-03-23 DIAGNOSIS — C61 MALIGNANT NEOPLASM OF PROSTATE (H): ICD-10-CM

## 2021-03-23 DIAGNOSIS — E78.5 HYPERLIPEMIA: ICD-10-CM

## 2021-03-23 LAB
CHOLEST SERPL-MCNC: 210 MG/DL
CREAT BLD-MCNC: 1.1 MG/DL (ref 0.66–1.25)
GFR SERPL CREATININE-BSD FRML MDRD: 66 ML/MIN/{1.73_M2}
HDLC SERPL-MCNC: 49 MG/DL
LDLC SERPL CALC-MCNC: 139 MG/DL
NONHDLC SERPL-MCNC: 161 MG/DL
PSA SERPL-MCNC: <0.01 UG/L (ref 0–4)
TRIGL SERPL-MCNC: 110 MG/DL

## 2021-03-23 PROCEDURE — 80061 LIPID PANEL: CPT | Performed by: UROLOGY

## 2021-03-23 PROCEDURE — 74178 CT ABD&PLV WO CNTR FLWD CNTR: CPT | Mod: TC | Performed by: RADIOLOGY

## 2021-03-23 PROCEDURE — 84153 ASSAY OF PSA TOTAL: CPT | Performed by: UROLOGY

## 2021-03-23 RX ORDER — IOPAMIDOL 755 MG/ML
100 INJECTION, SOLUTION INTRAVASCULAR ONCE
Status: COMPLETED | OUTPATIENT
Start: 2021-03-23 | End: 2021-03-23

## 2021-03-23 RX ADMIN — IOPAMIDOL 100 ML: 755 INJECTION, SOLUTION INTRAVASCULAR at 14:38

## 2021-03-25 DIAGNOSIS — N50.89 TESTICULAR LUMP: Primary | ICD-10-CM

## 2021-03-30 ENCOUNTER — ANCILLARY PROCEDURE (OUTPATIENT)
Dept: ULTRASOUND IMAGING | Facility: CLINIC | Age: 71
End: 2021-03-30
Attending: UROLOGY
Payer: COMMERCIAL

## 2021-03-30 DIAGNOSIS — N50.89 TESTICULAR LUMP: ICD-10-CM

## 2021-03-30 PROCEDURE — 93976 VASCULAR STUDY: CPT

## 2021-03-30 PROCEDURE — 76870 US EXAM SCROTUM: CPT

## 2021-04-05 ENCOUNTER — OFFICE VISIT (OUTPATIENT)
Dept: UROLOGY | Facility: CLINIC | Age: 71
End: 2021-04-05
Payer: COMMERCIAL

## 2021-04-05 VITALS
WEIGHT: 170 LBS | DIASTOLIC BLOOD PRESSURE: 78 MMHG | HEIGHT: 70 IN | SYSTOLIC BLOOD PRESSURE: 102 MMHG | BODY MASS INDEX: 24.34 KG/M2

## 2021-04-05 DIAGNOSIS — N28.89 LEFT RENAL MASS: ICD-10-CM

## 2021-04-05 DIAGNOSIS — C61 MALIGNANT NEOPLASM OF PROSTATE (H): Primary | ICD-10-CM

## 2021-04-05 DIAGNOSIS — N43.40 SPERMATOCELE: ICD-10-CM

## 2021-04-05 DIAGNOSIS — N52.31 ERECTILE DYSFUNCTION AFTER RADICAL PROSTATECTOMY: ICD-10-CM

## 2021-04-05 PROCEDURE — 99214 OFFICE O/P EST MOD 30 MIN: CPT | Performed by: UROLOGY

## 2021-04-05 RX ORDER — TADALAFIL 5 MG/1
5 TABLET ORAL DAILY
Qty: 30 TABLET | Refills: 3 | Status: SHIPPED | OUTPATIENT
Start: 2021-04-05

## 2021-04-05 ASSESSMENT — MIFFLIN-ST. JEOR: SCORE: 1527.36

## 2021-04-05 ASSESSMENT — PAIN SCALES - GENERAL: PAINLEVEL: NO PAIN (0)

## 2021-04-05 NOTE — PROGRESS NOTES
CHANDAN  CHIEF COMPLAINT   It was my pleasure to see Xu Rush who is a 70 year old male for follow-up of prostate cancer.      HPI   Xu Rush is a very pleasant 70 year old male who presents with a history of Prostate Cancer.       Received Lupron pre-operatively for >6 months to shink his prostate - initially 184cc  Initial PSA: 13.1  Biopsy Knott Score: 4 + 3 = 7  Risk Group: Intermediate  Status post RALP/BPLND on 6/5/20  Age at time of surgery: 69  Pathologic Knott Score: Unable to determine given hormone treatment  Positive Margins: None  Extra Capsular Extension: None  SV Involvement: None  Pathologic Stage: pT2  Number of Lymph Nodes Removed: 9  Number of Positive Lymph Nodes: 0    7/8/20:  Presented to [facility] ER on 6/22/20 with some left side flank pain    CT with 15x4 left sided fluid collection consistent with lymphocele and less likely hematoma. Pain has gradually been improving     Urine control is gradually getting better    9/23/20:  He returns for scheduled follow up  Urine control is getting better - off pads  Does have some occasional dribbling once daily  No attempts at erections, had baseline Erectile dysfunction    TODAY:  He returns today for follow-up  He noted a slight swelling above his right testicle about 3 to 4 weeks ago  He called our office and obtained a scrotal ultrasound prior to today's visit  Continues to do well with urine control  He continues to have poor erections    PHYSICAL EXAM  Patient is a 70 year old  male   Vitals: There were no vitals taken for this visit.  There is no height or weight on file to calculate BMI.  General Appearance Adult:   Alert, no acute distress, oriented  HENT: throat/mouth:normal, good dentition  Lungs: no respiratory distress, or pursed lip breathing  Heart: No obvious jugular venous distension present  Abdomen: non - distended  Incisions: well healed  Musculoskeltal: extremities normal, no peripheral edema  Skin: no suspicious  lesions or rashes  Neuro: Alert, oriented, speech and mentation normal  Psych: affect and mood normal  Gait: Normal   : Normal phallus, normal testes bilateral, small epididymal cyst on the right which is nontender to palpation    PSA:  9/22/20 0.0  Component      Latest Ref Rng & Units 3/23/2021   PSA      0 - 4 ug/L <0.01       PATHOLOGY:  FINAL DIAGNOSIS:   A: Prostate, bilateral seminal vesicles: Robotic-assisted radical   prostatectomy:   - Focal residual prostatic acinar adenocarcinoma with extensive treatment   effect, involving 1% of total   prostatic volume   - Tumor is confined to the prostate   - Resection margins negative for carcinoma   - Benign bilateral seminal vesicles   - See comment and synoptic report for details   - 164.1 g    B: Lymph node, bilateral pelvic: Dissection:   - Nine lymph nodes, negative for metastatic carcinoma (0/9)     IMAGING:  All pertinent imaging reviewed:    All imaging studies reviewed by me.  I personally reviewed these imaging films.  A formal report from radiology will follow.    U/S TESTICULAR 3/30/21:  FINDINGS: The right and left testicles measure 4.0 x 2.0 x 3.5 cm and  3.9 x 2.1 x 3.1 cm, respectively. There is symmetric testicular  echogenicity and blood flow. No testicular mass is demonstrated. In  the right epididymal head there are multiple cystic structures, the 2  largest measuring up to 3.3 cm and 1.6 cm. These epididymal cystic  structures account for the palpable lump. No epididymal hyperemia.  There are small bilateral hydroceles. There is a 3 mm hyperechoic  structure in the left hydrocele, likely small scrotal yoav. There is  no varicocele.                                                             IMPRESSION:  1. Benign-appearing right epididymal head cystic structures,  epididymal head cysts versus spermatoceles, accounting for the  palpable lump.  2. Normal-appearing testicles.  3. Small bilateral hydroceles and small scrotal yoav on the  left.    CT RENAL MASS 3/23/21:  FINDINGS:      Right urinary tract: No urinary calculi. No hydronephrosis. A few  small renal cortical lesions in the right kidney likely represent  cysts, although some of these are too small to characterize. The  collecting system and proximal ureter are moderately well opacified,  and no filling defects are identified.     Left urinary tract: No urinary calculi. No hydronephrosis. An  enhancing 1.4 cm lesion in the lower pole of the left kidney  posterolaterally (series 9 image 132) is suspicious for solid renal  neoplasm. This lesion is unchanged in size compared to 6/22/2020, but  has increased slightly in size compared to an older outside exam  performed 3/12/2018. A few smaller renal cortical cysts are noted in  the left kidney. The collecting system and proximal ureter are  moderately well opacified, and no filling defects are identified.     Other findings:  The visualized lung bases are clear. A small cyst in  the anterior segment of the right hepatic lobe superiorly measures 1.6  cm, and is unchanged. A few smaller low-density hepatic lesions may  also represent cysts, but are too small for definitive  characterization. The gallbladder is not seen, and is surgically  absent by history. Spleen, pancreas, and left adrenal gland are  unremarkable. A low density 1.5 cm right adrenal nodule is consistent  with a benign adenoma. Mild atherosclerotic aortoiliac calcification.  Visualized loops of small bowel and colon are of normal caliber. No  enlarged lymph nodes are identified in the abdomen.                                                               IMPRESSION:   1. An indeterminate enhancing 1.4 cm lesion in the lower pole of the  left kidney is suspicious for solid renal neoplasm, such as renal cell  carcinoma. Urologic consultation is recommended.  2. No other suspicious renal lesions are identified.  3. A low density 1.5 cm right adrenal nodule is consistent with  a  benign adenoma.    ASSESSMENT and PLAN  70-year-old man status post RALP/BPLND on 6-5-20 with pT2 disease, negative margins and negative lymph nodes.  164.1 g prostate.  Also with a small left renal mass and a right-sided spermatocele    Prostate cancer  - We discussed this PSA results  - F/U in 6-7 months with PSA prior    History of LEFT renal mass  -I reviewed his recent CT renal mass protocol which identifies the 1.4 cm lower pole lesion which has been stable  -We discussed that this is a high likelihood of being a small neoplasm of kidney, however given its very small size recommend continued active surveillance  -We will plan for repeat imaging in 1 year    Right-sided spermatocele  -We discussed that this is a benign cystic process which does not turn into her or increased risk for malignancy  -We discussed intervention would be a spermatocele ectomy, however that this is indicated only if the spermatocele becomes symptomatic    Time spent: 21 minutes spent on the date of the encounter doing chart review, history and exam, documentation and further activities as noted above.     Je Nolen MD   Urology  [facility]  [facility] Phone: [phone]  [facility] Phone: [phone]

## 2021-04-05 NOTE — LETTER
4/5/2021       RE: Xu Rush  [address]  [facility] [address]     Dear Colleague,    Thank you for referring your patient, Xu Rush, to the [facility] at [facility]. Please see a copy of my visit note below.    CHANDAN  CHIEF COMPLAINT   It was my pleasure to see Xu Rush who is a 70 year old male for follow-up of prostate cancer.      HPI   Xu Rush is a very pleasant 70 year old male who presents with a history of Prostate Cancer.       Received Lupron pre-operatively for >6 months to shink his prostate - initially 184cc  Initial PSA: 13.1  Biopsy Larkspur Score: 4 + 3 = 7  Risk Group: Intermediate  Status post RALP/BPLND on 6/5/20  Age at time of surgery: 69  Pathologic Celio Score: Unable to determine given hormone treatment  Positive Margins: None  Extra Capsular Extension: None  SV Involvement: None  Pathologic Stage: pT2  Number of Lymph Nodes Removed: 9  Number of Positive Lymph Nodes: 0    7/8/20:  Presented to [place] ER on 6/22/20 with some left side flank pain    CT with 15x4 left sided fluid collection consistent with lymphocele and less likely hematoma. Pain has gradually been improving     Urine control is gradually getting better    9/23/20:  He returns for scheduled follow up  Urine control is getting better - off pads  Does have some occasional dribbling once daily  No attempts at erections, had baseline Erectile dysfunction    TODAY:  He returns today for follow-up  He noted a slight swelling above his right testicle about 3 to 4 weeks ago  He called our office and obtained a scrotal ultrasound prior to today's visit  Continues to do well with urine control  He continues to have poor erections    PHYSICAL EXAM  Patient is a 70 year old  male   Vitals: There were no vitals taken for this visit.  There is no height or weight on file to calculate BMI.  General Appearance Adult:   Alert, no  acute distress, oriented  HENT: throat/mouth:normal, good dentition  Lungs: no respiratory distress, or pursed lip breathing  Heart: No obvious jugular venous distension present  Abdomen: non - distended  Incisions: well healed  Musculoskeltal: extremities normal, no peripheral edema  Skin: no suspicious lesions or rashes  Neuro: Alert, oriented, speech and mentation normal  Psych: affect and mood normal  Gait: Normal   : Normal phallus, normal testes bilateral, small epididymal cyst on the right which is nontender to palpation    PSA:  9/22/20 0.0  Component      Latest Ref Rng & Units 3/23/2021   PSA      0 - 4 ug/L <0.01       PATHOLOGY:  FINAL DIAGNOSIS:   A: Prostate, bilateral seminal vesicles: Robotic-assisted radical   prostatectomy:   - Focal residual prostatic acinar adenocarcinoma with extensive treatment   effect, involving 1% of total   prostatic volume   - Tumor is confined to the prostate   - Resection margins negative for carcinoma   - Benign bilateral seminal vesicles   - See comment and synoptic report for details   - 164.1 g    B: Lymph node, bilateral pelvic: Dissection:   - Nine lymph nodes, negative for metastatic carcinoma (0/9)     IMAGING:  All pertinent imaging reviewed:    All imaging studies reviewed by me.  I personally reviewed these imaging films.  A formal report from radiology will follow.    U/S TESTICULAR 3/30/21:  FINDINGS: The right and left testicles measure 4.0 x 2.0 x 3.5 cm and  3.9 x 2.1 x 3.1 cm, respectively. There is symmetric testicular  echogenicity and blood flow. No testicular mass is demonstrated. In  the right epididymal head there are multiple cystic structures, the 2  largest measuring up to 3.3 cm and 1.6 cm. These epididymal cystic  structures account for the palpable lump. No epididymal hyperemia.  There are small bilateral hydroceles. There is a 3 mm hyperechoic  structure in the left hydrocele, likely small scrotal yoav. There is  no varicocele.                                                              IMPRESSION:  1. Benign-appearing right epididymal head cystic structures,  epididymal head cysts versus spermatoceles, accounting for the  palpable lump.  2. Normal-appearing testicles.  3. Small bilateral hydroceles and small scrotal yoav on the left.    CT RENAL MASS 3/23/21:  FINDINGS:      Right urinary tract: No urinary calculi. No hydronephrosis. A few  small renal cortical lesions in the right kidney likely represent  cysts, although some of these are too small to characterize. The  collecting system and proximal ureter are moderately well opacified,  and no filling defects are identified.     Left urinary tract: No urinary calculi. No hydronephrosis. An  enhancing 1.4 cm lesion in the lower pole of the left kidney  posterolaterally (series 9 image 132) is suspicious for solid renal  neoplasm. This lesion is unchanged in size compared to 6/22/2020, but  has increased slightly in size compared to an older outside exam  performed 3/12/2018. A few smaller renal cortical cysts are noted in  the left kidney. The collecting system and proximal ureter are  moderately well opacified, and no filling defects are identified.     Other findings:  The visualized lung bases are clear. A small cyst in  the anterior segment of the right hepatic lobe superiorly measures 1.6  cm, and is unchanged. A few smaller low-density hepatic lesions may  also represent cysts, but are too small for definitive  characterization. The gallbladder is not seen, and is surgically  absent by history. Spleen, pancreas, and left adrenal gland are  unremarkable. A low density 1.5 cm right adrenal nodule is consistent  with a benign adenoma. Mild atherosclerotic aortoiliac calcification.  Visualized loops of small bowel and colon are of normal caliber. No  enlarged lymph nodes are identified in the abdomen.                                                               IMPRESSION:   1. An  indeterminate enhancing 1.4 cm lesion in the lower pole of the  left kidney is suspicious for solid renal neoplasm, such as renal cell  carcinoma. Urologic consultation is recommended.  2. No other suspicious renal lesions are identified.  3. A low density 1.5 cm right adrenal nodule is consistent with a  benign adenoma.    ASSESSMENT and PLAN  70-year-old man status post RALP/BPLND on 6-5-20 with pT2 disease, negative margins and negative lymph nodes.  164.1 g prostate.  Also with a small left renal mass and a right-sided spermatocele    Prostate cancer  - We discussed this PSA results  - F/U in 6-7 months with PSA prior    History of LEFT renal mass  -I reviewed his recent CT renal mass protocol which identifies the 1.4 cm lower pole lesion which has been stable  -We discussed that this is a high likelihood of being a small neoplasm of kidney, however given its very small size recommend continued active surveillance  -We will plan for repeat imaging in 1 year    Right-sided spermatocele  -We discussed that this is a benign cystic process which does not turn into her or increased risk for malignancy  -We discussed intervention would be a spermatocele ectomy, however that this is indicated only if the spermatocele becomes symptomatic    Time spent: 21 minutes spent on the date of the encounter doing chart review, history and exam, documentation and further activities as noted above.     Je Nolen MD   Urology  [facility]  [facility] Phone: [phone]  [facility] Phone: [phone]

## 2021-04-25 ENCOUNTER — HEALTH MAINTENANCE LETTER (OUTPATIENT)
Age: 71
End: 2021-04-25

## 2021-06-02 ENCOUNTER — RECORDS - HEALTHEAST (OUTPATIENT)
Dept: ADMINISTRATIVE | Facility: CLINIC | Age: 71
End: 2021-06-02

## 2021-10-10 ENCOUNTER — HEALTH MAINTENANCE LETTER (OUTPATIENT)
Age: 71
End: 2021-10-10

## 2022-05-22 ENCOUNTER — HEALTH MAINTENANCE LETTER (OUTPATIENT)
Age: 72
End: 2022-05-22

## 2022-09-18 ENCOUNTER — HEALTH MAINTENANCE LETTER (OUTPATIENT)
Age: 72
End: 2022-09-18

## 2022-11-22 ENCOUNTER — TELEPHONE (OUTPATIENT)
Dept: UROLOGY | Facility: CLINIC | Age: 72
End: 2022-11-22

## 2022-11-22 DIAGNOSIS — N28.89 RENAL MASS: Primary | ICD-10-CM

## 2022-11-22 DIAGNOSIS — C61 PROSTATE CANCER (H): ICD-10-CM

## 2022-11-22 NOTE — TELEPHONE ENCOUNTER
[facility] Call Center    Phone Message    May a detailed message be left on voicemail: yes     Reason for Call: Other: Pt called and would like an order put in for his yearly scan of his kidneys - please call pt once order is in so he can get that scheduled - thanks!     Action Taken: Message routed to:  Other: Uro    Travel Screening: Not Applicable

## 2022-11-29 ENCOUNTER — ANCILLARY PROCEDURE (OUTPATIENT)
Dept: CT IMAGING | Facility: CLINIC | Age: 72
End: 2022-11-29
Attending: UROLOGY
Payer: COMMERCIAL

## 2022-11-29 ENCOUNTER — LAB (OUTPATIENT)
Dept: LAB | Facility: CLINIC | Age: 72
End: 2022-11-29
Payer: COMMERCIAL

## 2022-11-29 DIAGNOSIS — C61 PROSTATE CANCER (H): ICD-10-CM

## 2022-11-29 DIAGNOSIS — N28.89 RENAL MASS: ICD-10-CM

## 2022-11-29 LAB
CREAT BLD-MCNC: 1.3 MG/DL (ref 0.7–1.3)
GFR SERPL CREATININE-BSD FRML MDRD: 58 ML/MIN/1.73M2
PSA SERPL-MCNC: <0.01 UG/L (ref 0–4)

## 2022-11-29 PROCEDURE — 74178 CT ABD&PLV WO CNTR FLWD CNTR: CPT | Mod: GC | Performed by: RADIOLOGY

## 2022-11-29 PROCEDURE — 84153 ASSAY OF PSA TOTAL: CPT

## 2022-11-29 PROCEDURE — 82565 ASSAY OF CREATININE: CPT

## 2022-11-29 PROCEDURE — 36415 COLL VENOUS BLD VENIPUNCTURE: CPT

## 2022-11-29 RX ORDER — IOPAMIDOL 755 MG/ML
104 INJECTION, SOLUTION INTRAVASCULAR ONCE
Status: COMPLETED | OUTPATIENT
Start: 2022-11-29 | End: 2022-11-29

## 2022-11-29 RX ADMIN — IOPAMIDOL 104 ML: 755 INJECTION, SOLUTION INTRAVASCULAR at 09:12

## 2023-01-29 ENCOUNTER — HEALTH MAINTENANCE LETTER (OUTPATIENT)
Age: 73
End: 2023-01-29

## 2023-02-21 ENCOUNTER — VIRTUAL VISIT (OUTPATIENT)
Dept: UROLOGY | Facility: CLINIC | Age: 73
End: 2023-02-21
Payer: COMMERCIAL

## 2023-02-21 DIAGNOSIS — C61 PROSTATE CANCER (H): Primary | ICD-10-CM

## 2023-02-21 DIAGNOSIS — N28.89 RENAL MASS: ICD-10-CM

## 2023-02-21 PROCEDURE — 99214 OFFICE O/P EST MOD 30 MIN: CPT | Mod: VID | Performed by: UROLOGY

## 2023-02-21 NOTE — PROGRESS NOTES
CHANDAN  CHIEF COMPLAINT   It was my pleasure to see Xu Rush who is a 72 year old male for follow-up of prostate cancer.      HPI   Xu Rush is a very pleasant 72 year old male who presents with a history of Prostate Cancer.       Received Lupron pre-operatively for >6 months to shink his prostate - initially 184cc  Initial PSA: 13.1  Biopsy Loretto Score: 4 + 3 = 7  Risk Group: Intermediate  Status post RALP/BPLND on 6/5/20  Age at time of surgery: 69  Pathologic Loretto Score: Unable to determine given hormone treatment  Positive Margins: None  Extra Capsular Extension: None  SV Involvement: None  Pathologic Stage: pT2  Number of Lymph Nodes Removed: 9  Number of Positive Lymph Nodes: 0    7/8/20:  Presented to [place] ER on 6/22/20 with some left side flank pain    CT with 15x4 left sided fluid collection consistent with lymphocele and less likely hematoma. Pain has gradually been improving     Urine control is gradually getting better    9/23/20:  He returns for scheduled follow up  Urine control is getting better - off pads  Does have some occasional dribbling once daily  No attempts at erections, had baseline Erectile dysfunction    4/5/2021:  He returns today for follow-up  He noted a slight swelling above his right testicle about 3 to 4 weeks ago  He called our office and obtained a scrotal ultrasound prior to today's visit  Continues to do well with urine control  He continues to have poor erections    TODAY 2/21/2023:  Follow-up today to review his PSA and surveillance CT scan  He is otherwise doing fairly well though his nursing a cold    PHYSICAL EXAM  Patient is a 72 year old  male   Vitals: There were no vitals taken for this visit.  There is no height or weight on file to calculate BMI.  General Appearance Adult:   Alert, no acute distress, oriented  HENT: throat/mouth:normal, good dentition  Lungs: no respiratory distress, or pursed lip breathing  Heart: No obvious jugular venous  distension present  Abdomen: non - distended  Musculoskeltal: extremities normal, no peripheral edema  Skin: no suspicious lesions or rashes  Neuro: Alert, oriented, speech and mentation normal  Psych: affect and mood normal    Creatinine   Date Value Ref Range Status   06/06/2020 0.93 0.66 - 1.25 mg/dL Final   9/9/2022  1.16  Creatinine POCT   Date Value Ref Range Status   11/29/2022 1.3 0.7 - 1.3 mg/dL Final        PSA:  9/22/20 0.0  Component PSA   Latest Ref Rng & Units 0.00 - 4.00 ug/L   3/23/2021 <0.01   11/29/2022 <0.01     PATHOLOGY:  FINAL DIAGNOSIS:   A: Prostate, bilateral seminal vesicles: Robotic-assisted radical   prostatectomy:   - Focal residual prostatic acinar adenocarcinoma with extensive treatment   effect, involving 1% of total   prostatic volume   - Tumor is confined to the prostate   - Resection margins negative for carcinoma   - Benign bilateral seminal vesicles   - See comment and synoptic report for details   - 164.1 g    B: Lymph node, bilateral pelvic: Dissection:   - Nine lymph nodes, negative for metastatic carcinoma (0/9)     IMAGING:  All pertinent imaging reviewed:    All imaging studies reviewed by me.  I personally reviewed these imaging films.  A formal report from radiology will follow.    U/S TESTICULAR 3/30/21:  FINDINGS: The right and left testicles measure 4.0 x 2.0 x 3.5 cm and  3.9 x 2.1 x 3.1 cm, respectively. There is symmetric testicular  echogenicity and blood flow. No testicular mass is demonstrated. In  the right epididymal head there are multiple cystic structures, the 2  largest measuring up to 3.3 cm and 1.6 cm. These epididymal cystic  structures account for the palpable lump. No epididymal hyperemia.  There are small bilateral hydroceles. There is a 3 mm hyperechoic  structure in the left hydrocele, likely small scrotal yoav. There is  no varicocele.                                                             IMPRESSION:  1. Benign-appearing right epididymal  head cystic structures,  epididymal head cysts versus spermatoceles, accounting for the  palpable lump.  2. Normal-appearing testicles.  3. Small bilateral hydroceles and small scrotal yoav on the left.    CT RENAL MASS 11/29/2022:  FINDINGS:  Left Kidney: Stable solid enhancing 1.4 x 1.4 x 1.4 cm lesion in the  posterior lateral aspect of the left mid kidney, without substantial  change from 3/23/2021 measured similarly. Additional scattered  subcentimeter simple appearing hypodensities throughout the left  kidney, too small to completely characterize on CT, but favored to  represent benign renal cysts. No hydronephrosis. No renal calculi.  Nonspecific perinephric fat stranding. No filling defects are seen  within the collecting system or proximal ureter on delayed pyelogram  images.     Right Kidney: No suspicious solid or enhancing renal masses. No  hydronephrosis. Scattered subcentimeter simple appearing hypodensities  throughout the left kidney, too small to completely characterize on  CT, but favored to represent benign renal cysts. No renal calculi.  Nonspecific perinephric fat stranding. No filling defects are seen  within the collecting system or proximal ureter on delayed pyelogram  images.     Remainder of abdomen and pelvis: Unchanged ovoid hypodensity within  the right lobe of the liver, measuring up to 1.3 cm (series 6 image  48), consistent with simple hepatic cyst. Additional stable  subcentimeter hypodensities of variable sizes scattered throughout the  liver, too small to characterize on CT, but favored to represent  simple hepatic cysts and with overall no significant change in overall  extent compared to 3/21/2022. There are a few scattered arterially  enhancing foci within the right hepatic lobe, which appear isodense on  subsequent phase images likely represent small shunts (for example as  seen on series 6, image 129, 175). Stable focal hypoattenuation  adjacent to represent focal fatty  deposition.     Gallbladder surgically absent. Mild intrahepatic and extrahepatic  biliary duct dilation, with the common bile duct measuring up to 8 mm,  favoring sequelae of reservoir effect and similar to prior exam.  Stable 1.3 cm benign right adrenal adenoma. The left adrenal gland is  unremarkable. Stable mild gastric wall thickening, likely in part due  to underdistention. Spleen, pancreas, and visualized loops of small  and large bowel are unremarkable.     Mild to moderate atherosclerotic calcifications of the abdominal  aorta. Renal veins and IVC are patent. No new or enlarging lymph nodes  identified within the visualized portions of the abdomen. Stable  prominent subcentimeter left periaortic node (series 6, image 128).     Lung bases:  Bibasilar atelectasis.     Bones and soft tissues: No acute osseous abnormality. Degenerative  changes of the visualized thoracolumbar spine. Small fat-containing  umbilical hernia.                                                        IMPRESSION:    1. Stable solid 1.4 cm enhancing mass within the left mid kidney,  favoring renal cell carcinoma. Overall, the lesion has not  significantly changed in size or appearance compared to prior exam  3/23/2021. No new suspicious renal masses.  2. Stable mild intra and extrahepatic biliary duct dilation, favoring  post cholecystectomy reservoir effect. Correlate with clinical  symptoms and laboratory findings if indicated.    ASSESSMENT and PLAN  72-year-old man status post RALP/BPLND on 6-5-20 with pT2 disease, negative margins and negative lymph nodes.  164.1 g prostate.  Also with a small left renal mass and a right-sided spermatocele    Prostate cancer  - We discussed this PSA results and his PSA remains undetectable  - I again reviewed his pathology from surgery  -Follow-up in 1 year with a PSA prior    History of LEFT renal mass  -I reviewed his recent CT renal mass protocol which identifies the 1.4 cm lower pole lesion  which has been stable  -I reviewed the CT images personally and agree with radiologist interpretation.  We discussed that it is very reassuring that this small mass has remained stable in size  - Follow-up in 1 year with repeat surveillance imaging        Time spent: 15 minutes spent on the date of the encounter doing chart review, history and exam, documentation and further activities as noted above.     Je Nolen MD   Urology  [facility]  [facility] Phone: [phone]  [facility] Phone: [phone]        Video-Visit Details    Type of service:  Video Visit    Video Start Time (time video started): 10:48 AM    Video End Time (time video stopped): 10:54 AM     Originating Location (pt. Location): Home    Distant Location (provider location):  On-site    Mode of Communication:  Video Conference via [employer]

## 2023-02-21 NOTE — NURSING NOTE
Is the patient currently in the state of [place]? YES    Visit mode:VIDEO    If the visit is dropped, the patient can be reconnected by: VIDEO VISIT: Send to e-mail at: [email or web address]    Will anyone else be joining the visit? NO      How would you like to obtain your AVS? MyChart    Are changes needed to the allergy or medication list? YES: Pt states he is also taking a vitamin b complex     Comments or concerns regarding today's visit:     Shelby Kocher

## 2023-05-22 ENCOUNTER — TELEPHONE (OUTPATIENT)
Dept: UROLOGY | Facility: CLINIC | Age: 73
End: 2023-05-22
Payer: COMMERCIAL

## 2023-05-22 NOTE — TELEPHONE ENCOUNTER
Left Voicemail (1st Attempt) for the patient to call back and schedule the following:    Appointment type: Return  Provider: Dr. Nolen  Return date: 2/21/2024  Specialty phone number: [phone]  Additional appointment(s) needed: PSA and CT done priro.   Additonal Notes: Follow-up in 1 year with a PSA and CT scan prior. This could be a virtual visit    Dominga ray Procedure   Dermatology, Surgery, Urology  [facility]

## 2023-08-21 ENCOUNTER — TELEPHONE (OUTPATIENT)
Dept: UROLOGY | Facility: CLINIC | Age: 73
End: 2023-08-21
Payer: COMMERCIAL

## 2023-08-21 NOTE — TELEPHONE ENCOUNTER
Left Voicemail (2nd Attempt) for the patient to call back and schedule the following:    Appointment type: lab appointment   Specialty phone number:[phone]   Additonal Notes: Follow-up in 1 year with a LUZ MARIA ray Procedure   Orthopedics, Podiatry, Sports Medicine, Ent ,Eye , Audiology, Adult Endocrine & Diabetes, Nutrition & Medication Therapy Management Specialties   [facility]

## 2023-08-22 NOTE — TELEPHONE ENCOUNTER
[facility] Call Center    Phone Message    May a detailed message be left on voicemail: yes     Reason for Call: Other: Pt called and scheduled labs same day as CT prior to apt w/ Dr. Nolen     Action Taken: Message routed to:  [facility]): Uro    Travel Screening: Not Applicable

## 2024-02-21 ENCOUNTER — DOCUMENTATION ONLY (OUTPATIENT)
Dept: UROLOGY | Facility: CLINIC | Age: 74
End: 2024-02-21

## 2024-02-21 ENCOUNTER — ANCILLARY PROCEDURE (OUTPATIENT)
Dept: CT IMAGING | Facility: CLINIC | Age: 74
End: 2024-02-21
Attending: UROLOGY
Payer: COMMERCIAL

## 2024-02-21 ENCOUNTER — LAB (OUTPATIENT)
Dept: LAB | Facility: CLINIC | Age: 74
End: 2024-02-21
Attending: UROLOGY
Payer: COMMERCIAL

## 2024-02-21 DIAGNOSIS — N28.89 RENAL MASS: ICD-10-CM

## 2024-02-21 DIAGNOSIS — C61 PROSTATE CANCER (H): ICD-10-CM

## 2024-02-21 LAB
CREAT BLD-MCNC: 1.3 MG/DL (ref 0.7–1.3)
EGFRCR SERPLBLD CKD-EPI 2021: 57 ML/MIN/1.73M2
PSA SERPL DL<=0.01 NG/ML-MCNC: <0.01 NG/ML (ref 0–6.5)

## 2024-02-21 PROCEDURE — 84153 ASSAY OF PSA TOTAL: CPT

## 2024-02-21 PROCEDURE — 36415 COLL VENOUS BLD VENIPUNCTURE: CPT

## 2024-02-21 PROCEDURE — 82565 ASSAY OF CREATININE: CPT

## 2024-02-21 PROCEDURE — 74178 CT ABD&PLV WO CNTR FLWD CNTR: CPT | Mod: GC | Performed by: RADIOLOGY

## 2024-02-21 RX ORDER — IOPAMIDOL 755 MG/ML
94 INJECTION, SOLUTION INTRAVASCULAR ONCE
Status: COMPLETED | OUTPATIENT
Start: 2024-02-21 | End: 2024-02-21

## 2024-02-21 RX ADMIN — IOPAMIDOL 94 ML: 755 INJECTION, SOLUTION INTRAVASCULAR at 09:57

## 2024-02-21 NOTE — PROGRESS NOTES
Patient is scheduled for a one year return with Dr. Nolen with repeat imaging and PSA. Patient is scheduled 2/21/24 for imaging and his PSA.     Ivone Colon LPN on 2/21/2024 at 9:49 AM      Future Appointments 2/21/2024 - 8/19/2024        Date Visit Type Length Department Provider     2/21/2024 10:00 AM CT RENAL MASS WO & W CONTRAST 20 min MG CT SCAN [facility]    Location Instructions:     [address] [place] [address]  Parking Imaging customers can park in the lots on either side of the driveway leading to the West Entrance of the building.  Entrance and check-in location Enter through the West Entrance doors. Proceed straight ahead, through the lobby, to Check-In B (adjacent to the [facility]). Please check-in at with the registration staff at the desk labeled Check-In B.              2/21/2024 10:30 AM LAB 10 min [facility] LABORATORY LAB FIRST FLOOR [facility]    Location Instructions:     The clinic is located at [address] in [place].&nbsp; We offer free parking in our on-site lot.              2/29/2024  9:00 AM RETURN PROSTATE CANCER 15 min  UROLOGY Je Nolen MD

## 2024-03-05 ENCOUNTER — VIRTUAL VISIT (OUTPATIENT)
Dept: UROLOGY | Facility: CLINIC | Age: 74
End: 2024-03-05
Payer: COMMERCIAL

## 2024-03-05 DIAGNOSIS — N28.89 RENAL MASS: ICD-10-CM

## 2024-03-05 DIAGNOSIS — C61 PROSTATE CANCER (H): Primary | ICD-10-CM

## 2024-03-05 PROCEDURE — 99213 OFFICE O/P EST LOW 20 MIN: CPT | Mod: 95 | Performed by: UROLOGY

## 2024-03-05 ASSESSMENT — PAIN SCALES - GENERAL: PAINLEVEL: NO PAIN (0)

## 2024-03-05 NOTE — NURSING NOTE
Is the patient currently in the state of [place]? YES    Visit mode:VIDEO    If the visit is dropped, the patient can be reconnected by: VIDEO VISIT: Send to e-mail at: [email or web address]    Will anyone else be joining the visit? NO  (If patient encounters technical issues they should call [phone]    How would you like to obtain your AVS? MyChart    Are changes needed to the allergy or medication list? No    Reason for visit: RECHECK    Tereza CALDWELL

## 2024-03-05 NOTE — PROGRESS NOTES
CHANDAN  CHIEF COMPLAINT   It was my pleasure to see Xu Rush who is a 73 year old male for follow-up of prostate cancer.      HPI   Xu Rush is a very pleasant 73 year old male who presents with a history of Prostate Cancer.       Received Lupron pre-operatively for >6 months to shink his prostate - initially 184cc  Initial PSA: 13.1  Biopsy [place] Score: 4 + 3 = 7  Risk Group: Intermediate  Status post RALP/BPLND on 6/5/20  Age at time of surgery: 69  Pathologic [place] Score: Unable to determine given hormone treatment  Positive Margins: None  Extra Capsular Extension: None  SV Involvement: None  Pathologic Stage: pT2  Number of Lymph Nodes Removed: 9  Number of Positive Lymph Nodes: 0    7/8/20:  Presented to [place] [facility] on 6/22/20 with some left side flank pain    CT with 15x4 left sided fluid collection consistent with lymphocele and less likely hematoma. Pain has gradually been improving     Urine control is gradually getting better    9/23/20:  He returns for scheduled follow up  Urine control is getting better - off pads  Does have some occasional dribbling once daily  No attempts at erections, had baseline Erectile dysfunction    4/5/2021:  He returns today for follow-up  He noted a slight swelling above his right testicle about 3 to 4 weeks ago  He called our office and obtained a scrotal ultrasound prior to today's visit  Continues to do well with urine control  He continues to have poor erections    2/21/2023:  Follow-up today to review his PSA and surveillance CT scan  He is otherwise doing fairly well though his nursing a cold    TODAY 3/5/2024:  Follow up today to review his PSA and surveillance CT  No issues over the past year  He has been doing well     PHYSICAL EXAM  Patient is a 73 year old  male   Vitals: There were no vitals taken for this visit.  There is no height or weight on file to calculate BMI.  General Appearance Adult:   Alert, no acute distress, oriented  Neuro: Alert,  oriented, speech and mentation normal  Psych: affect and mood normal       Creatinine   Date Value Ref Range Status   06/06/2020 0.93 0.66 - 1.25 mg/dL Final   9/9/2022  1.16  Creatinine POCT   Date Value Ref Range Status   11/29/2022 1.3 0.7 - 1.3 mg/dL Final    11/3/2023   1.17   2/21/2024  1.3     PSA PSA Tumor Marker   Latest Ref Rng 0.00 - 4.00 ug/L 0.00 - 6.50 ng/mL   3/23/2021 <0.01     11/29/2022 <0.01     2/21/2024  <0.01      PATHOLOGY:  FINAL DIAGNOSIS:   A: Prostate, bilateral seminal vesicles: Robotic-assisted radical   prostatectomy:   - Focal residual prostatic acinar adenocarcinoma with extensive treatment   effect, involving 1% of total   prostatic volume   - Tumor is confined to the prostate   - Resection margins negative for carcinoma   - Benign bilateral seminal vesicles   - See comment and synoptic report for details   - 164.1 g    B: Lymph node, bilateral pelvic: Dissection:   - Nine lymph nodes, negative for metastatic carcinoma (0/9)     IMAGING:  All pertinent imaging reviewed:    All imaging studies reviewed by me.  I personally reviewed these imaging films.  A formal report from radiology will follow.    CT RENAL MASS 2/21/2024:  IMPRESSION:    1. No substantial interval change in 1.4 cm enhancing lesion in the  left mid kidney since 2018. Given prolonged stability, this could  represent an oncocytoma over renal cell carcinoma, although these are  indistinguishable by imaging.  2. Hemorrhagic/proteinaceous and simple renal cysts.  3. Status post prostatectomy with reservoir effect and lateral  intrahepatic biliary dilatation, similar to prior.    ASSESSMENT and PLAN  73-year-old man status post RALP/BPLND on 6-5-20 with pT2 disease, negative margins and negative lymph nodes.  164.1 g prostate.  Also with a small left renal mass and a right-sided spermatocele    Prostate cancer  -Again reviewed his pathology from surgery  - Reviewed his PSA and his PSA has remained undetectable  - Follow-up  in 1 year for repeat PSA    History of LEFT renal mass  -I reviewed his updated CT scan which notes his stability of the mass at 1.4 cm in the left mid kidney.  We discussed that this has been stable in size for several years  - Will plan for continued surveillance  - Follow-up in 1 year with repeat imaging        Time spent: 12 minutes spent on the date of the encounter doing chart review, history and exam, documentation and further activities as noted above.     Je Nolen MD   Urology  [facility]  [facility] Phone: [phone]  [facility] Phone: [phone]      Virtual Visit Details    Type of service:  Video Visit   Video Start Time: 2:15 PM  Video End Time:2:22 PM    Originating Location (pt. Location): Home    Distant Location (provider location):  On-site  Platform used for Video Visit: [employer]Well

## 2024-09-06 ENCOUNTER — TELEPHONE (OUTPATIENT)
Dept: UROLOGY | Facility: CLINIC | Age: 74
End: 2024-09-06
Payer: COMMERCIAL

## 2024-09-06 NOTE — TELEPHONE ENCOUNTER
9/6 Spoke with and scheduled patient for 1 year follow up with Dr. Dang on 3/6/2025. Patient will need a PSA lab and Renal CT completed prior to follow up. Patient stated he would like to complete at [facility] in [place], [place]. Faxed lab and imaging orders to [phone].     [facility]   Dermatology, Surgery, Urology  [facility] [place]

## 2025-01-12 ENCOUNTER — HEALTH MAINTENANCE LETTER (OUTPATIENT)
Age: 75
End: 2025-01-12

## 2025-02-28 ENCOUNTER — ANCILLARY PROCEDURE (OUTPATIENT)
Dept: CT IMAGING | Facility: CLINIC | Age: 75
End: 2025-02-28
Attending: UROLOGY
Payer: COMMERCIAL

## 2025-02-28 DIAGNOSIS — N28.89 RENAL MASS: ICD-10-CM

## 2025-02-28 LAB
CREAT BLD-MCNC: 1.3 MG/DL (ref 0.7–1.3)
EGFRCR SERPLBLD CKD-EPI 2021: 57 ML/MIN/1.73M2

## 2025-02-28 PROCEDURE — 74178 CT ABD&PLV WO CNTR FLWD CNTR: CPT | Mod: GC | Performed by: RADIOLOGY

## 2025-02-28 PROCEDURE — 82565 ASSAY OF CREATININE: CPT

## 2025-02-28 RX ORDER — IOPAMIDOL 755 MG/ML
94 INJECTION, SOLUTION INTRAVASCULAR ONCE
Status: COMPLETED | OUTPATIENT
Start: 2025-02-28 | End: 2025-02-28

## 2025-02-28 RX ADMIN — IOPAMIDOL 94 ML: 755 INJECTION, SOLUTION INTRAVASCULAR at 09:23

## 2025-03-06 ENCOUNTER — VIRTUAL VISIT (OUTPATIENT)
Dept: UROLOGY | Facility: CLINIC | Age: 75
End: 2025-03-06
Payer: COMMERCIAL

## 2025-03-06 DIAGNOSIS — N28.89 RENAL MASS: ICD-10-CM

## 2025-03-06 DIAGNOSIS — C61 PROSTATE CANCER (H): Primary | ICD-10-CM

## 2025-03-06 NOTE — PROGRESS NOTES
CHANDAN  CHIEF COMPLAINT   It was my pleasure to see Xu Rush who is a 74 year old male for follow-up of prostate cancer.      HPI   Xu Rush is a very pleasant 74 year old male who presents with a history of Prostate Cancer.       Received Lupron pre-operatively for >6 months to shink his prostate - initially 184cc  Initial PSA: 13.1  Biopsy [place] Score: 4 + 3 = 7  Risk Group: Intermediate  Status post RALP/BPLND on 6/5/20  Age at time of surgery: 69  Pathologic [place] Score: Unable to determine given hormone treatment  Positive Margins: None  Extra Capsular Extension: None  SV Involvement: None  Pathologic Stage: pT2  Number of Lymph Nodes Removed: 9  Number of Positive Lymph Nodes: 0    7/8/20:  Presented to [place] ER on 6/22/20 with some left side flank pain    CT with 15x4 left sided fluid collection consistent with lymphocele and less likely hematoma. Pain has gradually been improving     Urine control is gradually getting better    9/23/20:  He returns for scheduled follow up  Urine control is getting better - off pads  Does have some occasional dribbling once daily  No attempts at erections, had baseline Erectile dysfunction    4/5/2021:  He returns today for follow-up  He noted a slight swelling above his right testicle about 3 to 4 weeks ago  He called our office and obtained a scrotal ultrasound prior to today's visit  Continues to do well with urine control  He continues to have poor erections    2/21/2023:  Follow-up today to review his PSA and surveillance CT scan  He is otherwise doing fairly well though his nursing a cold    3/5/2024:  Follow up today to review his PSA and surveillance CT  No issues over the past year  He has been doing well     TODAY 3/6/2025:  Doing well today with no changes or issues over the last year    PHYSICAL EXAM  Patient is a 74 year old  male   Vitals: There were no vitals taken for this visit.  There is no height or weight on file to calculate  BMI.  General Appearance Adult:   Alert, no acute distress, oriented  Neuro: Alert, oriented, speech and mentation normal  Psych: affect and mood normal          Creatinine   Date Value Ref Range Status   06/06/2020 0.93 0.66 - 1.25 mg/dL Final   9/9/2022  1.16  Creatinine POCT   Date Value Ref Range Status   11/29/2022 1.3 0.7 - 1.3 mg/dL Final   11/3/2023   1.17  2/21/2024  1.3  2/28/2025  1.3     PSA PSA Tumor Marker   Latest Ref Rng 0.00 - 4.00 ug/L 0.00 - 6.50 ng/mL   3/23/2021 <0.01     11/29/2022 <0.01     2/21/2024  <0.01    2/28/2025  <0.01        PATHOLOGY:  6/5/2020 RALP/BPLND  FINAL DIAGNOSIS:   A: Prostate, bilateral seminal vesicles: Robotic-assisted radical   prostatectomy:   - Focal residual prostatic acinar adenocarcinoma with extensive treatment   effect, involving 1% of total   prostatic volume   - Tumor is confined to the prostate   - Resection margins negative for carcinoma   - Benign bilateral seminal vesicles   - See comment and synoptic report for details   - 164.1 g    B: Lymph node, bilateral pelvic: Dissection:   - Nine lymph nodes, negative for metastatic carcinoma (0/9)     IMAGING:  All pertinent imaging reviewed:    All imaging studies reviewed by me.  I personally reviewed these imaging films.  A formal report from radiology will follow.    CT RENAL MASS 2/28/2025:  Impression:   1. Stable size of the enhancing indolent mass in the left kidney since  2/21/2024 and 3/12/2018. No evidence of loco-regional extension or  metastatic disease. Differential includes renal cell carcinoma and  oncocytoma.  2. Incidental findings as detailed above.    ASSESSMENT and PLAN  74-year-old man status post RALP/BPLND on 6-5-20 with pT2 disease, negative margins and negative lymph nodes.  164.1 g prostate.  Also with a small left renal mass and a right-sided spermatocele    Prostate cancer  -Again reviewed his pathology from surgery  - Reviewed his PSA and his PSA has remained undetectable  - Follow-up  in 2 years for repeat PSA    History of LEFT renal mass  -I reviewed his updated CT scan which notes his stability of the mass at 1.4 cm in the left mid kidney.  We discussed that this has been stable in size for several years - since 2018  - Will plan for continued surveillance  - Follow-up in 2 years with repeat imaging        Time spent: 10 minutes spent on the date of the encounter doing chart review, history and exam, documentation and further activities as noted above.     Note copy attestation: the elements have been reviewed, edited as needed, and remain pertinent to today's visit.     Je Nolen MD   Urology  [facility]  [facility] Phone: [phone]  [facility] Phone: [phone]          Virtual Visit Details    Type of service:  Video Visit   Video Start Time: 10:30 AM  Video End Time:10:39 AM    Originating Location (pt. Location): Home    Distant Location (provider location):  On-site  Platform used for Video Visit: Ahmet

## 2025-03-06 NOTE — NURSING NOTE
Patient c/o of low back pain, wondering if it's kidney stone related.     Current patient location: [address] [place] [address]    Is the patient currently in the state of [place]? YES    Visit mode: VIDEO    If the visit is dropped, the patient can be reconnected by:VIDEO VISIT: Text to cell phone:   Telephone Information:   Mobile [phone]       Will anyone else be joining the visit? NO  (If patient encounters technical issues they should call [phone]    Are changes needed to the allergy or medication list? No and Pt stated no med changes    Are refills needed on medications prescribed by this physician? NO    Rooming Documentation:  Not applicable    Reason for visit: RECHECK    Gina CALDWELL

## (undated) DEVICE — DAVINCI XI NDL DRIVER LARGE 470006

## (undated) DEVICE — CATH FOLEY 18FR 5ML LATEX 0165SI18

## (undated) DEVICE — KIT PATIENT POSITIONING PIGAZZI LATEX FREE 40580

## (undated) DEVICE — SU VICRYL 0 TIE 12X18" J906G

## (undated) DEVICE — TUBING CONMED AIRSEAL SMOKE EVAC INSUFFLATION ASM-EVAC

## (undated) DEVICE — DAVINCI XI GRASPER ENDOWRIST PROGRASP 470093

## (undated) DEVICE — ADH SKIN CLOSURE PREMIERPRO EXOFIN 1.0ML 3470

## (undated) DEVICE — GLOVE PROTEXIS BLUE W/NEU-THERA 6.5  2D73EB65

## (undated) DEVICE — DAVINCI XI DRAPE ARM 470015

## (undated) DEVICE — CLIP ENDO HEMO-LOC PURPLE LG 544240

## (undated) DEVICE — SU WND CLOSURE V-LOC 3-0 CV-23 6" VLOCM1904

## (undated) DEVICE — TAPE CLOTH ADHESIVE 3" LATEX FREE

## (undated) DEVICE — GLOVE PROTEXIS W/NEU-THERA 7.5  2D73TE75

## (undated) DEVICE — LINEN TOWEL PACK X5 5464

## (undated) DEVICE — GOWN IMPERVIOUS SPECIALTY XL/XLONG 39049

## (undated) DEVICE — SU MONOCRYL 0 CT-1 27" Y340H

## (undated) DEVICE — DRAIN JACKSON PRATT CHANNEL 19FR ROUND HUBLESS SIL JP-2230

## (undated) DEVICE — SU PDS II 0 CT-2 27" Z334H

## (undated) DEVICE — SPONGE RAY-TEC 4X8" 7318

## (undated) DEVICE — CATH FOLEY 18FR 30ML LATEX 0166SI18

## (undated) DEVICE — DRAIN JACKSON PRATT RESERVOIR 100ML SU130-1305

## (undated) DEVICE — SU VICRYL 0 CT-2 27" J334H

## (undated) DEVICE — SOL WATER IRRIG 1000ML BOTTLE 2F7114

## (undated) DEVICE — SU SILK 2-0 FSL 18" 677G

## (undated) DEVICE — SOL NACL 0.9% INJ 1000ML BAG 2B1324X

## (undated) DEVICE — PROTECTOR ARM ONE-STEP TRENDELENBURG 40418

## (undated) DEVICE — TROCAR AIRSEAL BLADELESS 12X120MM IAS12-120

## (undated) DEVICE — SU WND CLOSURE VLOC 90 ABS 3-0 VIOLET 6" CV-23 VLOCM0804

## (undated) DEVICE — PACK DAVINCI UROLOGY SBA15UDFSG

## (undated) DEVICE — NDL INSUFFLATION 13GA 120MM C2201

## (undated) DEVICE — DAVINCI XI SEAL UNIVERSAL 5-8MM 470361

## (undated) DEVICE — DAVINCI XI DRAPE COLUMN 470341

## (undated) DEVICE — DAVINCI HOT SHEARS TIP COVER  400180

## (undated) DEVICE — Device

## (undated) DEVICE — SUCTION IRR STRYKERFLOW II W/TIP 250-070-520

## (undated) DEVICE — ENDO POUCH UNIV RETRIEVAL SYSTEM INZII 10MM CD001

## (undated) DEVICE — SU MONOCRYL 4-0 PS-2 18" UND Y496G

## (undated) RX ORDER — FENTANYL CITRATE 50 UG/ML
INJECTION, SOLUTION INTRAMUSCULAR; INTRAVENOUS
Status: DISPENSED
Start: 2020-06-05

## (undated) RX ORDER — HEPARIN SODIUM 5000 [USP'U]/.5ML
INJECTION, SOLUTION INTRAVENOUS; SUBCUTANEOUS
Status: DISPENSED
Start: 2020-06-05

## (undated) RX ORDER — PROPOFOL 10 MG/ML
INJECTION, EMULSION INTRAVENOUS
Status: DISPENSED
Start: 2020-06-05

## (undated) RX ORDER — VECURONIUM BROMIDE 1 MG/ML
INJECTION, POWDER, LYOPHILIZED, FOR SOLUTION INTRAVENOUS
Status: DISPENSED
Start: 2020-06-05

## (undated) RX ORDER — NEOSTIGMINE METHYLSULFATE 1 MG/ML
VIAL (ML) INJECTION
Status: DISPENSED
Start: 2020-06-05

## (undated) RX ORDER — CEFAZOLIN SODIUM 2 G/100ML
INJECTION, SOLUTION INTRAVENOUS
Status: DISPENSED
Start: 2020-06-05

## (undated) RX ORDER — CEFAZOLIN SODIUM 1 G/3ML
INJECTION, POWDER, FOR SOLUTION INTRAMUSCULAR; INTRAVENOUS
Status: DISPENSED
Start: 2020-06-05

## (undated) RX ORDER — BUPIVACAINE HYDROCHLORIDE 2.5 MG/ML
INJECTION, SOLUTION EPIDURAL; INFILTRATION; INTRACAUDAL
Status: DISPENSED
Start: 2020-06-05

## (undated) RX ORDER — HYDROMORPHONE HYDROCHLORIDE 1 MG/ML
INJECTION, SOLUTION INTRAMUSCULAR; INTRAVENOUS; SUBCUTANEOUS
Status: DISPENSED
Start: 2020-06-05

## (undated) RX ORDER — DEXAMETHASONE SODIUM PHOSPHATE 4 MG/ML
INJECTION, SOLUTION INTRA-ARTICULAR; INTRALESIONAL; INTRAMUSCULAR; INTRAVENOUS; SOFT TISSUE
Status: DISPENSED
Start: 2020-06-05

## (undated) RX ORDER — LIDOCAINE HYDROCHLORIDE 20 MG/ML
INJECTION, SOLUTION EPIDURAL; INFILTRATION; INTRACAUDAL; PERINEURAL
Status: DISPENSED
Start: 2020-06-05

## (undated) RX ORDER — ONDANSETRON 2 MG/ML
INJECTION INTRAMUSCULAR; INTRAVENOUS
Status: DISPENSED
Start: 2020-06-05

## (undated) RX ORDER — GLYCOPYRROLATE 0.2 MG/ML
INJECTION, SOLUTION INTRAMUSCULAR; INTRAVENOUS
Status: DISPENSED
Start: 2020-06-05